# Patient Record
Sex: MALE | Race: WHITE | NOT HISPANIC OR LATINO | URBAN - METROPOLITAN AREA
[De-identification: names, ages, dates, MRNs, and addresses within clinical notes are randomized per-mention and may not be internally consistent; named-entity substitution may affect disease eponyms.]

---

## 2021-12-21 ENCOUNTER — INPATIENT (INPATIENT)
Facility: HOSPITAL | Age: 61
LOS: 2 days | Discharge: HOME | DRG: 871 | End: 2021-12-24
Attending: FAMILY MEDICINE | Admitting: HOSPITALIST
Payer: COMMERCIAL

## 2021-12-21 VITALS — WEIGHT: 246.04 LBS | HEIGHT: 71 IN

## 2021-12-21 DIAGNOSIS — R50.9 FEVER, UNSPECIFIED: ICD-10-CM

## 2021-12-21 LAB
ADD ON TEST-SPECIMEN IN LAB: SIGNIFICANT CHANGE UP
ADD ON TEST-SPECIMEN IN LAB: SIGNIFICANT CHANGE UP
ALBUMIN SERPL ELPH-MCNC: 3.3 G/DL — SIGNIFICANT CHANGE UP (ref 3.3–5)
ALP SERPL-CCNC: 75 U/L — SIGNIFICANT CHANGE UP (ref 40–120)
ALT FLD-CCNC: 32 U/L — SIGNIFICANT CHANGE UP (ref 12–78)
ANION GAP SERPL CALC-SCNC: 6 MMOL/L — SIGNIFICANT CHANGE UP (ref 5–17)
APPEARANCE UR: CLEAR — SIGNIFICANT CHANGE UP
AST SERPL-CCNC: 26 U/L — SIGNIFICANT CHANGE UP (ref 15–37)
BASOPHILS # BLD AUTO: 0.11 K/UL — SIGNIFICANT CHANGE UP (ref 0–0.2)
BASOPHILS NFR BLD AUTO: 0.6 % — SIGNIFICANT CHANGE UP (ref 0–2)
BILIRUB SERPL-MCNC: 1.6 MG/DL — HIGH (ref 0.2–1.2)
BILIRUB UR-MCNC: NEGATIVE — SIGNIFICANT CHANGE UP
BUN SERPL-MCNC: 18 MG/DL — SIGNIFICANT CHANGE UP (ref 7–23)
CALCIUM SERPL-MCNC: 10.7 MG/DL — HIGH (ref 8.5–10.1)
CHLORIDE SERPL-SCNC: 100 MMOL/L — SIGNIFICANT CHANGE UP (ref 96–108)
CO2 SERPL-SCNC: 23 MMOL/L — SIGNIFICANT CHANGE UP (ref 22–31)
COLOR SPEC: YELLOW — SIGNIFICANT CHANGE UP
CREAT SERPL-MCNC: 1.18 MG/DL — SIGNIFICANT CHANGE UP (ref 0.5–1.3)
DIFF PNL FLD: ABNORMAL
EOSINOPHIL # BLD AUTO: 0 K/UL — SIGNIFICANT CHANGE UP (ref 0–0.5)
EOSINOPHIL NFR BLD AUTO: 0 % — SIGNIFICANT CHANGE UP (ref 0–6)
FLUAV AG NPH QL: SIGNIFICANT CHANGE UP
FLUBV AG NPH QL: SIGNIFICANT CHANGE UP
GLUCOSE SERPL-MCNC: 106 MG/DL — HIGH (ref 70–99)
GLUCOSE UR QL: NEGATIVE MG/DL — SIGNIFICANT CHANGE UP
HCT VFR BLD CALC: 45 % — SIGNIFICANT CHANGE UP (ref 39–50)
HGB BLD-MCNC: 15.1 G/DL — SIGNIFICANT CHANGE UP (ref 13–17)
IMM GRANULOCYTES NFR BLD AUTO: 0.7 % — SIGNIFICANT CHANGE UP (ref 0–1.5)
KETONES UR-MCNC: ABNORMAL
LACTATE SERPL-SCNC: 1.1 MMOL/L — SIGNIFICANT CHANGE UP (ref 0.7–2)
LEUKOCYTE ESTERASE UR-ACNC: ABNORMAL
LYMPHOCYTES # BLD AUTO: 1.76 K/UL — SIGNIFICANT CHANGE UP (ref 1–3.3)
LYMPHOCYTES # BLD AUTO: 9.3 % — LOW (ref 13–44)
MAGNESIUM SERPL-MCNC: 2 MG/DL — SIGNIFICANT CHANGE UP (ref 1.6–2.6)
MCHC RBC-ENTMCNC: 29.6 PG — SIGNIFICANT CHANGE UP (ref 27–34)
MCHC RBC-ENTMCNC: 33.6 GM/DL — SIGNIFICANT CHANGE UP (ref 32–36)
MCV RBC AUTO: 88.2 FL — SIGNIFICANT CHANGE UP (ref 80–100)
MONOCYTES # BLD AUTO: 1.48 K/UL — HIGH (ref 0–0.9)
MONOCYTES NFR BLD AUTO: 7.9 % — SIGNIFICANT CHANGE UP (ref 2–14)
NEUTROPHILS # BLD AUTO: 15.36 K/UL — HIGH (ref 1.8–7.4)
NEUTROPHILS NFR BLD AUTO: 81.5 % — HIGH (ref 43–77)
NITRITE UR-MCNC: POSITIVE
PH UR: 5 — SIGNIFICANT CHANGE UP (ref 5–8)
PHOSPHATE SERPL-MCNC: 1.8 MG/DL — LOW (ref 2.5–4.5)
PLATELET # BLD AUTO: 295 K/UL — SIGNIFICANT CHANGE UP (ref 150–400)
POTASSIUM SERPL-MCNC: 4.4 MMOL/L — SIGNIFICANT CHANGE UP (ref 3.5–5.3)
POTASSIUM SERPL-SCNC: 4.4 MMOL/L — SIGNIFICANT CHANGE UP (ref 3.5–5.3)
PROT SERPL-MCNC: 7.6 GM/DL — SIGNIFICANT CHANGE UP (ref 6–8.3)
PROT UR-MCNC: 15 MG/DL
RBC # BLD: 5.1 M/UL — SIGNIFICANT CHANGE UP (ref 4.2–5.8)
RBC # FLD: 12.8 % — SIGNIFICANT CHANGE UP (ref 10.3–14.5)
RSV RNA NPH QL NAA+NON-PROBE: SIGNIFICANT CHANGE UP
SARS-COV-2 RNA SPEC QL NAA+PROBE: SIGNIFICANT CHANGE UP
SODIUM SERPL-SCNC: 129 MMOL/L — LOW (ref 135–145)
SP GR SPEC: 1.02 — SIGNIFICANT CHANGE UP (ref 1.01–1.02)
TSH SERPL-MCNC: <0.01 UU/ML — LOW (ref 0.34–4.82)
UROBILINOGEN FLD QL: NEGATIVE MG/DL — SIGNIFICANT CHANGE UP
WBC # BLD: 18.85 K/UL — HIGH (ref 3.8–10.5)
WBC # FLD AUTO: 18.85 K/UL — HIGH (ref 3.8–10.5)

## 2021-12-21 PROCEDURE — 93010 ELECTROCARDIOGRAM REPORT: CPT

## 2021-12-21 PROCEDURE — 71046 X-RAY EXAM CHEST 2 VIEWS: CPT | Mod: 26

## 2021-12-21 PROCEDURE — 85610 PROTHROMBIN TIME: CPT

## 2021-12-21 PROCEDURE — G0378: CPT

## 2021-12-21 PROCEDURE — 99285 EMERGENCY DEPT VISIT HI MDM: CPT

## 2021-12-21 PROCEDURE — 99223 1ST HOSP IP/OBS HIGH 75: CPT

## 2021-12-21 PROCEDURE — 85027 COMPLETE CBC AUTOMATED: CPT

## 2021-12-21 PROCEDURE — 36415 COLL VENOUS BLD VENIPUNCTURE: CPT

## 2021-12-21 PROCEDURE — 84480 ASSAY TRIIODOTHYRONINE (T3): CPT

## 2021-12-21 PROCEDURE — 86140 C-REACTIVE PROTEIN: CPT

## 2021-12-21 PROCEDURE — 84436 ASSAY OF TOTAL THYROXINE: CPT

## 2021-12-21 PROCEDURE — 87389 HIV-1 AG W/HIV-1&-2 AB AG IA: CPT

## 2021-12-21 PROCEDURE — 80048 BASIC METABOLIC PNL TOTAL CA: CPT

## 2021-12-21 PROCEDURE — 86039 ANTINUCLEAR ANTIBODIES (ANA): CPT

## 2021-12-21 PROCEDURE — 80053 COMPREHEN METABOLIC PANEL: CPT

## 2021-12-21 PROCEDURE — 86803 HEPATITIS C AB TEST: CPT

## 2021-12-21 PROCEDURE — 74176 CT ABD & PELVIS W/O CONTRAST: CPT

## 2021-12-21 PROCEDURE — 85652 RBC SED RATE AUTOMATED: CPT

## 2021-12-21 PROCEDURE — 84145 PROCALCITONIN (PCT): CPT

## 2021-12-21 PROCEDURE — 85025 COMPLETE CBC W/AUTO DIFF WBC: CPT

## 2021-12-21 RX ORDER — CEFTRIAXONE 500 MG/1
1000 INJECTION, POWDER, FOR SOLUTION INTRAMUSCULAR; INTRAVENOUS ONCE
Refills: 0 | Status: COMPLETED | OUTPATIENT
Start: 2021-12-21 | End: 2021-12-21

## 2021-12-21 RX ORDER — PREGABALIN 225 MG/1
1000 CAPSULE ORAL DAILY
Refills: 0 | Status: DISCONTINUED | OUTPATIENT
Start: 2021-12-21 | End: 2021-12-24

## 2021-12-21 RX ORDER — METOPROLOL TARTRATE 50 MG
1 TABLET ORAL
Qty: 0 | Refills: 0 | DISCHARGE

## 2021-12-21 RX ORDER — VALSARTAN 80 MG/1
1 TABLET ORAL
Qty: 0 | Refills: 0 | DISCHARGE

## 2021-12-21 RX ORDER — CHOLECALCIFEROL (VITAMIN D3) 125 MCG
2 CAPSULE ORAL
Qty: 0 | Refills: 0 | DISCHARGE

## 2021-12-21 RX ORDER — ONDANSETRON 8 MG/1
4 TABLET, FILM COATED ORAL EVERY 8 HOURS
Refills: 0 | Status: DISCONTINUED | OUTPATIENT
Start: 2021-12-21 | End: 2021-12-24

## 2021-12-21 RX ORDER — SODIUM CHLORIDE 9 MG/ML
1000 INJECTION INTRAMUSCULAR; INTRAVENOUS; SUBCUTANEOUS
Refills: 0 | Status: DISCONTINUED | OUTPATIENT
Start: 2021-12-21 | End: 2021-12-22

## 2021-12-21 RX ORDER — CHOLECALCIFEROL (VITAMIN D3) 125 MCG
2000 CAPSULE ORAL DAILY
Refills: 0 | Status: DISCONTINUED | OUTPATIENT
Start: 2021-12-21 | End: 2021-12-24

## 2021-12-21 RX ORDER — ROSUVASTATIN CALCIUM 5 MG/1
1 TABLET ORAL
Qty: 0 | Refills: 0 | DISCHARGE

## 2021-12-21 RX ORDER — PREGABALIN 225 MG/1
1 CAPSULE ORAL
Qty: 0 | Refills: 0 | DISCHARGE

## 2021-12-21 RX ORDER — LANOLIN ALCOHOL/MO/W.PET/CERES
3 CREAM (GRAM) TOPICAL AT BEDTIME
Refills: 0 | Status: DISCONTINUED | OUTPATIENT
Start: 2021-12-21 | End: 2021-12-24

## 2021-12-21 RX ORDER — ATORVASTATIN CALCIUM 80 MG/1
20 TABLET, FILM COATED ORAL AT BEDTIME
Refills: 0 | Status: DISCONTINUED | OUTPATIENT
Start: 2021-12-21 | End: 2021-12-24

## 2021-12-21 RX ORDER — CEFTRIAXONE 500 MG/1
1000 INJECTION, POWDER, FOR SOLUTION INTRAMUSCULAR; INTRAVENOUS EVERY 24 HOURS
Refills: 0 | Status: DISCONTINUED | OUTPATIENT
Start: 2021-12-22 | End: 2021-12-24

## 2021-12-21 RX ORDER — SODIUM CHLORIDE 9 MG/ML
1000 INJECTION INTRAMUSCULAR; INTRAVENOUS; SUBCUTANEOUS ONCE
Refills: 0 | Status: COMPLETED | OUTPATIENT
Start: 2021-12-21 | End: 2021-12-21

## 2021-12-21 RX ORDER — VALSARTAN 80 MG/1
40 TABLET ORAL DAILY
Refills: 0 | Status: DISCONTINUED | OUTPATIENT
Start: 2021-12-21 | End: 2021-12-24

## 2021-12-21 RX ORDER — SODIUM CHLORIDE 9 MG/ML
1250 INJECTION INTRAMUSCULAR; INTRAVENOUS; SUBCUTANEOUS ONCE
Refills: 0 | Status: COMPLETED | OUTPATIENT
Start: 2021-12-21 | End: 2021-12-21

## 2021-12-21 RX ORDER — IBUPROFEN 200 MG
600 TABLET ORAL ONCE
Refills: 0 | Status: COMPLETED | OUTPATIENT
Start: 2021-12-21 | End: 2021-12-21

## 2021-12-21 RX ORDER — ACETAMINOPHEN 500 MG
1000 TABLET ORAL ONCE
Refills: 0 | Status: COMPLETED | OUTPATIENT
Start: 2021-12-21 | End: 2021-12-21

## 2021-12-21 RX ORDER — METOPROLOL TARTRATE 50 MG
25 TABLET ORAL DAILY
Refills: 0 | Status: DISCONTINUED | OUTPATIENT
Start: 2021-12-21 | End: 2021-12-24

## 2021-12-21 RX ORDER — ACETAMINOPHEN 500 MG
650 TABLET ORAL EVERY 6 HOURS
Refills: 0 | Status: DISCONTINUED | OUTPATIENT
Start: 2021-12-21 | End: 2021-12-24

## 2021-12-21 RX ADMIN — Medication 600 MILLIGRAM(S): at 17:51

## 2021-12-21 RX ADMIN — SODIUM CHLORIDE 1250 MILLILITER(S): 9 INJECTION INTRAMUSCULAR; INTRAVENOUS; SUBCUTANEOUS at 19:25

## 2021-12-21 RX ADMIN — CEFTRIAXONE 100 MILLIGRAM(S): 500 INJECTION, POWDER, FOR SOLUTION INTRAMUSCULAR; INTRAVENOUS at 19:25

## 2021-12-21 RX ADMIN — Medication 1000 MILLIGRAM(S): at 17:51

## 2021-12-21 RX ADMIN — SODIUM CHLORIDE 2000 MILLILITER(S): 9 INJECTION INTRAMUSCULAR; INTRAVENOUS; SUBCUTANEOUS at 17:01

## 2021-12-21 NOTE — ED STATDOCS - OBJECTIVE STATEMENT
60 y/o male with a PMHx of enlarged prostate, gout, HTN, HLD, hyperthyroid presents to the ED c/o chills x2 days. Pt reports intermittent fevers over the last month. Pt developed chills 2 days ago. +diarrhea, +HA, +unintentional 20lb weight loss over the last 2 months. Pt reports he has the urge to urinate but can not. Recent travel to [place]. Nonsmoker. No drug use. No other complaints at this time.

## 2021-12-21 NOTE — H&P ADULT - HISTORY OF PRESENT ILLNESS
61 year old male patient with pertinent past medical history of Hyperthyroidism presented to the ED complaining of intermittent fevers( tmax of 103) associated with chills, diaphoresis, loose stools, headache, urinary urgency and frequency. Of note, patient also with unintentional 20 pound weight loss over the past 2 months. Endorses being compliant with his methimazole.      In the ED patient found to have fever( 103) , tachycardia, wbc of 18

## 2021-12-21 NOTE — ED STATDOCS - NSICDXPASTMEDICALHX_GEN_ALL_CORE_FT
PAST MEDICAL HISTORY:  Enlarged prostate     Gout     HLD (hyperlipidemia)     HTN (hypertension)     Hyperthyroidism

## 2021-12-21 NOTE — H&P ADULT - NSHPPHYSICALEXAM_GEN_ALL_CORE
Vital Signs Last 24 Hrs  T(C): 36.8 (21 Dec 2021 19:06), Max: 39.4 (21 Dec 2021 15:45)  T(F): 98.2 (21 Dec 2021 19:06), Max: 103 (21 Dec 2021 15:45)  HR: 100 (21 Dec 2021 19:06) (100 - 112)  BP: 114/78 (21 Dec 2021 19:06) (112/79 - 114/78)  BP(mean): 88 (21 Dec 2021 15:45) (88 - 88)  RR: 18 (21 Dec 2021 19:06) (17 - 18)  SpO2: 93% (21 Dec 2021 19:06) (93% - 93%)

## 2021-12-21 NOTE — ED ADULT TRIAGE NOTE - CHIEF COMPLAINT QUOTE
pt c/o decreased PO intake, chills, loose stool, 20 lb weight loss in the last 2 months. pt states "I feel the urge to pee but only a small amount comes out." PMH- HTN, gout, HLD, hyperthryoid

## 2021-12-21 NOTE — ED STATDOCS - ATTENDING CONTRIBUTION TO CARE
I, Lalit Gregorio MD,  performed the initial face to face bedside interview with this patient regarding history of present illness, review of symptoms and relevant past medical, social and family history.  I completed an independent physical examination.  I was the initial provider who evaluated this patient.   I personally saw the patient and performed a substantive portion of the visit including all aspects of the medical decision making.  I have signed out the follow up of any pending tests (i.e. labs, radiological studies) to the ACP.  I have communicated the patient’s plan of care and disposition with the ACP.  The history, relevant review of systems, past medical and surgical history, medical decision making, and physical examination was documented by the scribe in my presence and I attest to the accuracy of the documentation.

## 2021-12-21 NOTE — ED STATDOCS - PROGRESS NOTE DETAILS
signed Tiffanie Biswas PA-C Pt seen initially in intake by Dr. Gregorio   61M c/o intermittent fever for a few weeks, particularly the past few days. several COVID-19 test negative. PMH lyme, hypothyroid. Pt also notes some difficulty with urination and an unintentional 20lb wt loss in 2 months. pt alert, NAD, exam non focal. PLan labs, lyme, babesia, flu/covid/rsv, UA, cxr. Pt agrees with plan of  care. PMDESTINEE Ramos signed Tiffanie Biswas PA-C   Labs notable for WBC 18, TSH undetectable, , +uti. flu/covid/rsv negative. cxr negative. Pt notes he is on methimazole for hyperthyroid. Denies seizures or agitation but +soft stools. Concern for possible thyroid storm. Will admit and treat for fever and uti, must r/o thyroid storm.  lactate negative, fluid bolus given using ideal body weight. Pt agrees with admission and plan of care. Case discussed with and admission accepted by hospitalist Dr. Velasquez.

## 2021-12-21 NOTE — ED STATDOCS - CARE PLAN
1 Principal Discharge DX:	Fever  Secondary Diagnosis:	Low TSH level  Secondary Diagnosis:	Acute UTI

## 2021-12-21 NOTE — ED STATDOCS - CLINICAL SUMMARY MEDICAL DECISION MAKING FREE TEXT BOX
Pt presents with intermittent fevers x1 month now with diarrhea and difficulty urinating. Plan: labs, XR, bladder scan.

## 2021-12-21 NOTE — ED ADULT NURSE NOTE - OBJECTIVE STATEMENT
Patient has had loose stool for 24 hours, 20 pound weight loss due to poor appetite and not eating over past 2 months and difficulty passing urine.

## 2021-12-21 NOTE — H&P ADULT - ASSESSMENT
61 year old male patient with intermittent fevers for 3 weeks now with urosepsis        -Admit to [facility]          # Sepsis  -likely secondary to UTI  -f/u blood and urine cx  -give IVF hydration in the ED  -will continue with rocephin    # Fever of Unknown origin  -etiology not clear  -pt with uti but does not explain persistent elevated temps to 103  -will get routine CT abdomen/pelvis and chest to rule out abscess/ intra-abdominal pathology  -check ROBIN, CRP, procalcitonin,hepatitis, esr, lyme and babesia  -f/u blood and urine cx to rule out occult bacteremia  -get ID consult      #Hyponatremia  -trial of IVF hydration  -trend sodium    # Hx of Hyperthyroidism  -pt with unintentional weight loss despite being compliant with meds  -also tachycardic and diaphoretic  -unsure if above infection contributing   -get routine endocrinology evaluation    #HTN  -on valsartan    #DVT ppx  -encourage ambulation

## 2021-12-21 NOTE — ED ADULT TRIAGE NOTE - CCCP TRG CHIEF CMPLNT
multiple medical complaints Subsequent Stages Histo Method Verbiage: Using a similar technique to that described above, a thin layer of tissue was removed from all areas where tumor was visible on the previous stage.  The tissue was again oriented, mapped, dyed, and processed as above.

## 2021-12-22 LAB
ALBUMIN SERPL ELPH-MCNC: 2.7 G/DL — LOW (ref 3.3–5)
ALP SERPL-CCNC: 60 U/L — SIGNIFICANT CHANGE UP (ref 40–120)
ALT FLD-CCNC: 25 U/L — SIGNIFICANT CHANGE UP (ref 12–78)
ANION GAP SERPL CALC-SCNC: 8 MMOL/L — SIGNIFICANT CHANGE UP (ref 5–17)
AST SERPL-CCNC: 14 U/L — LOW (ref 15–37)
B BURGDOR C6 AB SER-ACNC: NEGATIVE — SIGNIFICANT CHANGE UP
B BURGDOR IGG+IGM SER QL IB: SIGNIFICANT CHANGE UP
B BURGDOR IGG+IGM SER-ACNC: 0.14 INDEX — SIGNIFICANT CHANGE UP (ref 0.01–0.89)
BASOPHILS # BLD AUTO: 0.06 K/UL — SIGNIFICANT CHANGE UP (ref 0–0.2)
BASOPHILS NFR BLD AUTO: 0.6 % — SIGNIFICANT CHANGE UP (ref 0–2)
BILIRUB SERPL-MCNC: 0.9 MG/DL — SIGNIFICANT CHANGE UP (ref 0.2–1.2)
BUN SERPL-MCNC: 22 MG/DL — SIGNIFICANT CHANGE UP (ref 7–23)
CALCIUM SERPL-MCNC: 9.6 MG/DL — SIGNIFICANT CHANGE UP (ref 8.5–10.1)
CHLORIDE SERPL-SCNC: 107 MMOL/L — SIGNIFICANT CHANGE UP (ref 96–108)
CO2 SERPL-SCNC: 22 MMOL/L — SIGNIFICANT CHANGE UP (ref 22–31)
CREAT SERPL-MCNC: 0.82 MG/DL — SIGNIFICANT CHANGE UP (ref 0.5–1.3)
CRP SERPL-MCNC: 148 MG/L — HIGH
EOSINOPHIL # BLD AUTO: 0.01 K/UL — SIGNIFICANT CHANGE UP (ref 0–0.5)
EOSINOPHIL NFR BLD AUTO: 0.1 % — SIGNIFICANT CHANGE UP (ref 0–6)
ERYTHROCYTE [SEDIMENTATION RATE] IN BLOOD: 56 MM/HR — HIGH (ref 0–20)
GLUCOSE SERPL-MCNC: 118 MG/DL — HIGH (ref 70–99)
HCT VFR BLD CALC: 38.6 % — LOW (ref 39–50)
HCV AB S/CO SERPL IA: 0.15 S/CO — SIGNIFICANT CHANGE UP (ref 0–0.99)
HCV AB SERPL-IMP: SIGNIFICANT CHANGE UP
HGB BLD-MCNC: 13.1 G/DL — SIGNIFICANT CHANGE UP (ref 13–17)
IMM GRANULOCYTES NFR BLD AUTO: 0.6 % — SIGNIFICANT CHANGE UP (ref 0–1.5)
INR BLD: 1.32 RATIO — HIGH (ref 0.88–1.16)
LYMPHOCYTES # BLD AUTO: 0.63 K/UL — LOW (ref 1–3.3)
LYMPHOCYTES # BLD AUTO: 6 % — LOW (ref 13–44)
MCHC RBC-ENTMCNC: 29.7 PG — SIGNIFICANT CHANGE UP (ref 27–34)
MCHC RBC-ENTMCNC: 33.9 GM/DL — SIGNIFICANT CHANGE UP (ref 32–36)
MCV RBC AUTO: 87.5 FL — SIGNIFICANT CHANGE UP (ref 80–100)
MONOCYTES # BLD AUTO: 0.88 K/UL — SIGNIFICANT CHANGE UP (ref 0–0.9)
MONOCYTES NFR BLD AUTO: 8.4 % — SIGNIFICANT CHANGE UP (ref 2–14)
NEUTROPHILS # BLD AUTO: 8.89 K/UL — HIGH (ref 1.8–7.4)
NEUTROPHILS NFR BLD AUTO: 84.3 % — HIGH (ref 43–77)
PLATELET # BLD AUTO: 218 K/UL — SIGNIFICANT CHANGE UP (ref 150–400)
POTASSIUM SERPL-MCNC: 3.6 MMOL/L — SIGNIFICANT CHANGE UP (ref 3.5–5.3)
POTASSIUM SERPL-SCNC: 3.6 MMOL/L — SIGNIFICANT CHANGE UP (ref 3.5–5.3)
PROCALCITONIN SERPL-MCNC: 1.73 NG/ML — HIGH (ref 0.02–0.1)
PROT SERPL-MCNC: 6.3 GM/DL — SIGNIFICANT CHANGE UP (ref 6–8.3)
PROTHROM AB SERPL-ACNC: 15.1 SEC — HIGH (ref 10.6–13.6)
RBC # BLD: 4.41 M/UL — SIGNIFICANT CHANGE UP (ref 4.2–5.8)
RBC # FLD: 12.9 % — SIGNIFICANT CHANGE UP (ref 10.3–14.5)
SODIUM SERPL-SCNC: 137 MMOL/L — SIGNIFICANT CHANGE UP (ref 135–145)
WBC # BLD: 10.53 K/UL — HIGH (ref 3.8–10.5)
WBC # FLD AUTO: 10.53 K/UL — HIGH (ref 3.8–10.5)

## 2021-12-22 PROCEDURE — 74176 CT ABD & PELVIS W/O CONTRAST: CPT | Mod: 26

## 2021-12-22 PROCEDURE — 99232 SBSQ HOSP IP/OBS MODERATE 35: CPT

## 2021-12-22 RX ORDER — ENOXAPARIN SODIUM 100 MG/ML
40 INJECTION SUBCUTANEOUS DAILY
Refills: 0 | Status: DISCONTINUED | OUTPATIENT
Start: 2021-12-22 | End: 2021-12-24

## 2021-12-22 RX ORDER — TAMSULOSIN HYDROCHLORIDE 0.4 MG/1
0.4 CAPSULE ORAL AT BEDTIME
Refills: 0 | Status: DISCONTINUED | OUTPATIENT
Start: 2021-12-22 | End: 2021-12-24

## 2021-12-22 RX ORDER — TAMSULOSIN HYDROCHLORIDE 0.4 MG/1
0.4 CAPSULE ORAL AT BEDTIME
Refills: 0 | Status: DISCONTINUED | OUTPATIENT
Start: 2021-12-22 | End: 2021-12-22

## 2021-12-22 RX ADMIN — TAMSULOSIN HYDROCHLORIDE 0.4 MILLIGRAM(S): 0.4 CAPSULE ORAL at 22:02

## 2021-12-22 RX ADMIN — Medication 650 MILLIGRAM(S): at 03:41

## 2021-12-22 RX ADMIN — Medication 2000 UNIT(S): at 09:25

## 2021-12-22 RX ADMIN — Medication 3 MILLIGRAM(S): at 22:02

## 2021-12-22 RX ADMIN — Medication 25 MILLIGRAM(S): at 09:25

## 2021-12-22 RX ADMIN — Medication 650 MILLIGRAM(S): at 04:00

## 2021-12-22 RX ADMIN — VALSARTAN 40 MILLIGRAM(S): 80 TABLET ORAL at 09:25

## 2021-12-22 RX ADMIN — PREGABALIN 1000 MICROGRAM(S): 225 CAPSULE ORAL at 09:25

## 2021-12-22 RX ADMIN — Medication 650 MILLIGRAM(S): at 19:02

## 2021-12-22 RX ADMIN — ENOXAPARIN SODIUM 40 MILLIGRAM(S): 100 INJECTION SUBCUTANEOUS at 17:16

## 2021-12-22 RX ADMIN — SODIUM CHLORIDE 100 MILLILITER(S): 9 INJECTION INTRAMUSCULAR; INTRAVENOUS; SUBCUTANEOUS at 00:34

## 2021-12-22 RX ADMIN — CEFTRIAXONE 100 MILLIGRAM(S): 500 INJECTION, POWDER, FOR SOLUTION INTRAMUSCULAR; INTRAVENOUS at 17:16

## 2021-12-22 RX ADMIN — SODIUM CHLORIDE 100 MILLILITER(S): 9 INJECTION INTRAMUSCULAR; INTRAVENOUS; SUBCUTANEOUS at 08:15

## 2021-12-22 RX ADMIN — ATORVASTATIN CALCIUM 20 MILLIGRAM(S): 80 TABLET, FILM COATED ORAL at 22:03

## 2021-12-22 NOTE — CONSULT NOTE ADULT - ASSESSMENT
61 year old male patient with pertinent past medical history of Hyperthyroidism admitted on 12/21 for evaluation of fever to 103, chills, sweats, headache and urinary frequency and urgency; the patient notes that at night there is more sweating, but was not sure if this was due to his hyperthyroidism. The patient is feeling better since admission and having received iv antibiotics. Was vaccinated against COVID-19 infection as well as influenza. Was not on any antibiotics prior for urinary infection or hospitalized prior for infections.     1. Patient admitted with pyelonephritis, most likely as the cause of his fever; also noted with leukocytosis as this is probably reactive to infection  - follow up cultures   - serial cbc and monitor temperature   - reviewed prior medical records to evaluate for resistant or atypical pathogens   - iv hydration and supportive care   - will continue ceftriaxone as ordered; probably a component of prostatitis as well  - consideration for urology evaluation  - will most likely will need a longer course of antibiotics, but oral when ready for discharge  2. other issues: per medicine

## 2021-12-22 NOTE — DIETITIAN INITIAL EVALUATION ADULT. - OTHER INFO
61 year old male patient with pertinent past medical history of Hyperthyroidism presented to the ED complaining of intermittent fevers( tmax of 103) associated with chills, diaphoresis, loose stools, headache, urinary urgency and frequency. Of note, patient also with unintentional 20 pound weight loss over the past 2 months. Endorses being compliant with his methimazole. CT abdomen showed mild bilateral perinephric stranding without Hydro- patient admitted with UTI and IV antibiotic was started. # Sepsis-likely secondary to UTI. NFPE indicates no muscle/fat wasting at this time. Criteria met for severe protein/calorie malnutrition in context of acute illness. Discussed adding protein enriched foods with all meals to meet increased demand. Will continue to monitor and follow up prn.

## 2021-12-22 NOTE — DIETITIAN INITIAL EVALUATION ADULT. - ADD RECOMMEND
1) continue with regular diet and encourage protein enriched foods with all meals to meet increased demand 2) monitor BM if none x > 3 days consider initiating bowel meds to decrease risk of constipation. 3) MVI w/ minerals to meet RDI's 4) monitor lytes and hydration replete prn 5) if po declines consider additional po supplement shakes daily in between meals

## 2021-12-22 NOTE — DIETITIAN INITIAL EVALUATION ADULT. - PERTINENT LABORATORY DATA
12-22    137  |  107  |  22  ----------------------------<  118<H>  3.6   |  22  |  0.82    Ca    9.6      22 Dec 2021 06:55  Phos  1.8     12-21  Mg     2.0     12-21    TPro  6.3  /  Alb  2.7<L>  /  TBili  0.9  /  DBili  x   /  AST  14<L>  /  ALT  25  /  AlkPhos  60  12-22  BMI: BMI (kg/m2): 34.3 (12-21-21 @ 14:25)  HbA1c:   Glucose:   BP: 124/70 (12-22-21 @ 08:34) (112/79 - 124/78)  Lipid Panel:

## 2021-12-22 NOTE — PATIENT PROFILE ADULT - FALL HARM RISK - UNIVERSAL INTERVENTIONS
Bed in lowest position, wheels locked, appropriate side rails in place/Call bell, personal items and telephone in reach/Instruct patient to call for assistance before getting out of bed or chair/Non-slip footwear when patient is out of bed/[place] to call system/Physically safe environment - no spills, clutter or unnecessary equipment/Purposeful Proactive Rounding/Room/bathroom lighting operational, light cord in reach

## 2021-12-22 NOTE — CONSULT NOTE ADULT - SUBJECTIVE AND OBJECTIVE BOX
Patient is a 61y old  Male who presents with a chief complaint of Fever/ UTI (22 Dec 2021 14:16)    HPI:  61 year old male patient with pertinent past medical history of Hyperthyroidism admitted on  for evaluation of fever to 103, chills, sweats, headache and urinary frequency and urgency; the patient notes that at night there is more sweating, but was not sure if this was due to his hyperthyroidism. The patient is feeling better since admission and having received iv antibiotics. Was vaccinated against COVID-19 infection as well as influenza. Was not on any antibiotics prior for urinary infection or hospitalized prior for infections.           PMH: as above  PSH: as above  Meds: per reconciliation sheet, noted below  MEDICATIONS  (STANDING):  atorvastatin 20 milliGRAM(s) Oral at bedtime  cefTRIAXone   IVPB 1000 milliGRAM(s) IV Intermittent every 24 hours  cholecalciferol Oral Tab/Cap - Peds 2000 Unit(s) Oral daily  cyanocobalamin 1000 MICROGram(s) Oral daily  enoxaparin Injectable 40 milliGRAM(s) SubCutaneous daily  methimazole 5 milliGRAM(s) Oral daily  metoprolol succinate ER 25 milliGRAM(s) Oral daily  tamsulosin 0.4 milliGRAM(s) Oral at bedtime  valsartan 40 milliGRAM(s) Oral daily    MEDICATIONS  (PRN):  acetaminophen     Tablet .. 650 milliGRAM(s) Oral every 6 hours PRN Temp greater or equal to 38C (100.4F), Mild Pain (1 - 3)  aluminum hydroxide/magnesium hydroxide/simethicone Suspension 30 milliLiter(s) Oral every 4 hours PRN Dyspepsia  artificial  tears Solution 1 Drop(s) Both EYES every 4 hours PRN Dry Eyes  melatonin 3 milliGRAM(s) Oral at bedtime PRN Insomnia  ondansetron Injectable 4 milliGRAM(s) IV Push every 8 hours PRN Nausea and/or Vomiting    Allergies    No Known Allergies    Intolerances      Social: no smoking, no alcohol, no illegal drugs; no recent travel, no exposure to TB  FAMILY HISTORY:     no history of premature cardiovascular disease in first degree relatives  ROS: the patient has no HA, no dizziness, no sore throat, no blurry vision, no CP, no palpitations, no SOB, no cough, no abdominal pain, no diarrhea, no N/V,  no leg pain, no claudication, no rash, no joint aches, no rectal pain or bleeding, no night sweats  All other systems reviewed and are negative    Vital Signs Last 24 Hrs  T(C): 36.7 (22 Dec 2021 08:34), Max: 37.8 (22 Dec 2021 06:22)  T(F): 98 (22 Dec 2021 08:34), Max: 100.1 (22 Dec 2021 06:22)  HR: 97 (22 Dec 2021 08:34) (68 - 100)  BP: 124/70 (22 Dec 2021 08:34) (114/78 - 124/78)  BP(mean): 90 (21 Dec 2021 23:20) (90 - 90)  RR: 18 (22 Dec 2021 08:34) (18 - 20)  SpO2: 96% (22 Dec 2021 08:34) (93% - 99%)  Daily     Daily Weight in k.6 (22 Dec 2021 00:28)    PE:    Constitutional: frail looking  HEENT: NC/AT, EOMI, PERRLA, conjunctivae clear; ears and nose atraumatic; pharynx clear  Neck: supple; thyroid not palpable  Back: no tenderness  Respiratory: respiratory effort normal; clear to auscultation  Cardiovascular: S1S2 regular, no murmurs  Abdomen: soft, not tender, not distended, positive BS; no liver or spleen organomegaly  Genitourinary: no suprapubic tenderness, no CVA tenderness  Musculoskeletal: no muscle tenderness, no joint swelling or tenderness  Neurological/ Psychiatric: AxOx3, judgement and insight normal;  moving all extremities  Skin: no rashes; no palpable lesions    Labs: all available labs reviewed                        13.1   10.53 )-----------( 218      ( 22 Dec 2021 06:55 )             38.6     12-    137  |  107  |  22  ----------------------------<  118<H>  3.6   |  22  |  0.82    Ca    9.6      22 Dec 2021 06:55  Phos  1.8     12-  Mg     2.0     12-    TPro  6.3  /  Alb  2.7<L>  /  TBili  0.9  /  DBili  x   /  AST  14<L>  /  ALT  25  /  AlkPhos  60  12-22     LIVER FUNCTIONS - ( 22 Dec 2021 06:55 )  Alb: 2.7 g/dL / Pro: 6.3 gm/dL / ALK PHOS: 60 U/L / ALT: 25 U/L / AST: 14 U/L / GGT: x           Urinalysis Basic - ( 21 Dec 2021 17:19 )    Color: Yellow / Appearance: Clear / S.025 / pH: x  Gluc: x / Ketone: Large  / Bili: Negative / Urobili: Negative mg/dL   Blood: x / Protein: 15 mg/dL / Nitrite: Positive   Leuk Esterase: Trace / RBC: 6-10 /HPF / WBC 11-25   Sq Epi: x / Non Sq Epi: Occasional / Bacteria: Many      < from: CT Abdomen and Pelvis No Cont (21 @ 00:03) >    ACC: [id number] EXAM:  CT ABDOMEN AND PELVIS                          PROCEDURE DATE:  2021          INTERPRETATION:  CLINICAL INFORMATION: Fever.    PROCEDURE:  Helical axial images were obtained from the domes of the diaphragm   through the pubic symphysis without intravenous or oral contrast. Coronal   and sagittal reformats were also obtained.    CONTRAST/COMPLICATIONS:  IV Contrast: NONE  Oral Contrast: NONE  Complications: None reported at time of study completion    COMPARISON: None.    FINDINGS: Evaluation of the abdominal/pelvic organs, viscera and   vasculature is limited without intravenous contrast.    LOWER CHEST: Mild atelectasis.    LIVER: Unremarkable.  GALLBLADDER/BILE DUCTS: No intrahepatic or extrahepatic biliary   dilatation. No radiopaque gallstone.  PANCREAS: Diffuse fatty atrophy.  SPLEEN: Unremarkable.    ADRENALS: Unremarkable.  KIDNEYS/URETERS: Nonspecific mild bilateral perinephric stranding without   hydroureteronephrosis. No obstructing radiopaque urinary tract stone.   Nonobstructing 0.2 x 0.3 cm left renal stone. A 1.0 x 1.0 cm right renal   cyst.  BLADDER: Partially distended. Prominent wall.  REPRODUCTIVE ORGANS: Enlarged prostate. Nonspecific stranding surrounding   the seminal vesicles/prostate.    BOWEL: No bowel obstruction. Unremarkable appendix.  PERITONEUM: No drainable fluid collection or free air.  VESSELS: Atherosclerosis. Normal caliber of the abdominal aorta.  RETROPERITONEUM: Subcentimeter lymph nodes without lymphadenopathy.  ABDOMINAL WALL/SOFT TISSUES: Small fat-containing inguinal hernias.  BONES: Degenerative changes of the spine. Chronic anterior wedging of the   thoracolumbar spine.    IMPRESSION:    Nonspecific mild bilateral perinephric stranding without   hydroureteronephrosis or obstructing radiopaque urinary tract stone.   Prominent bladder wall. Nonspecific stranding surrounding the seminal   vesicles/prostate. Recommend clinical correlation to assess urinary tract   infection/prostatitis/seminal vesiculitis.    Additional findings as described.    < end of copied text >      Radiology: all available radiological tests reviewed    Advanced directives addressed: full resuscitation

## 2021-12-22 NOTE — DIETITIAN INITIAL EVALUATION ADULT. - MALNUTRITION
severe protein/calorie malnutrition in context of acute illness severe protein/calorie malnutrition in context of acute illness r/t suboptimal nutrition 2/2 UTI AEB po intake <50% x > 5 days and significant weight loss x 2.5 months (7.8%)

## 2021-12-22 NOTE — DIETITIAN NUTRITION RISK NOTIFICATION - ADDITIONAL COMMENTS/DIETITIAN RECOMMENDATIONS
· Additional Recommendations  1) continue with regular diet and encourage protein enriched foods with all meals to meet increased demand 2) monitor BM if none x > 3 days consider initiating bowel meds to decrease risk of constipation. 3) MVI w/ minerals to meet RDI's 4) monitor lytes and hydration replete prn 5) if po declines consider additional po supplement shakes daily in between meals

## 2021-12-22 NOTE — PROGRESS NOTE ADULT - ASSESSMENT
61 year old male patient with intermittent fevers for 3 weeks admitted with sepsis of urinary origin     # Sepsis  -likely secondary to UTI  -f/u blood and urine cx- pending   -give IVF hydration in the ED  -Rocephin IV  -monitor temps  - monitor CBC   - leukocytosis- improved  - ID consult      # Fever of Unknown origin  -etiology not clear  -pt with uti but does not explain persistent elevated temps to 103  -will get routine CT abdomen/pelvis and chest to rule out abscess/ intra-abdominal pathology  -check ROBIN, CRP, procalcitonin,hepatitis, esr, lyme and babesia- pending results   -f/u blood and urine cx to rule out occult bacteremia  -get ID consult    #Hyponatremia- resolved   -trial of IVF hydration  -trend sodium    # Hx of Hyperthyroidism  -pt with unintentional weight loss despite being compliant with meds  -also tachycardic and diaphoretic  -unsure if above infection contributing   -get routine endocrinology evaluation  -TSH <0.01- pending T3 and T4    #HTN  -on valsartan    #DVT ppx  -encourage ambulation    Case d/w team on IDR.        61 year old male patient with intermittent fevers for 3 weeks admitted with sepsis of urinary origin     # Sepsis  -likely secondary to UTI  -f/u blood and urine cx- pending   -give IVF hydration in the ED  -Rocephin IV  -monitor temps  - monitor CBC   - leukocytosis- improved  - ID consult      # Fever of Unknown origin  -etiology not clear  -pt with uti but does not explain persistent elevated temps to 103  -will get routine CT abdomen/pelvis and chest to rule out abscess/ intra-abdominal pathology  -check ROBIN, CRP, procalcitonin,hepatitis, esr, lyme and babesia- pending results   -f/u blood and urine cx to rule out occult bacteremia  -get ID consult    #Hyponatremia- resolved   -trial of IVF hydration  -trend sodium    # Hx of Hyperthyroidism  -pt with unintentional weight loss despite being compliant with meds  -also tachycardic and diaphoretic  -unsure if above infection contributing   -get routine endocrinology evaluation  -TSH <0.01- pending T3 and T4  - restart methimazole     #HTN  -on valsartan    #DVT ppx  -encourage ambulation    Case d/w team on IDR.

## 2021-12-23 LAB
ANION GAP SERPL CALC-SCNC: 7 MMOL/L — SIGNIFICANT CHANGE UP (ref 5–17)
BUN SERPL-MCNC: 15 MG/DL — SIGNIFICANT CHANGE UP (ref 7–23)
CALCIUM SERPL-MCNC: 9.7 MG/DL — SIGNIFICANT CHANGE UP (ref 8.5–10.1)
CHLORIDE SERPL-SCNC: 107 MMOL/L — SIGNIFICANT CHANGE UP (ref 96–108)
CO2 SERPL-SCNC: 23 MMOL/L — SIGNIFICANT CHANGE UP (ref 22–31)
CREAT SERPL-MCNC: 0.79 MG/DL — SIGNIFICANT CHANGE UP (ref 0.5–1.3)
GLUCOSE SERPL-MCNC: 116 MG/DL — HIGH (ref 70–99)
HCT VFR BLD CALC: 39.7 % — SIGNIFICANT CHANGE UP (ref 39–50)
HGB BLD-MCNC: 13.7 G/DL — SIGNIFICANT CHANGE UP (ref 13–17)
MCHC RBC-ENTMCNC: 29.8 PG — SIGNIFICANT CHANGE UP (ref 27–34)
MCHC RBC-ENTMCNC: 34.5 GM/DL — SIGNIFICANT CHANGE UP (ref 32–36)
MCV RBC AUTO: 86.3 FL — SIGNIFICANT CHANGE UP (ref 80–100)
PLATELET # BLD AUTO: 218 K/UL — SIGNIFICANT CHANGE UP (ref 150–400)
POTASSIUM SERPL-MCNC: 3.7 MMOL/L — SIGNIFICANT CHANGE UP (ref 3.5–5.3)
POTASSIUM SERPL-SCNC: 3.7 MMOL/L — SIGNIFICANT CHANGE UP (ref 3.5–5.3)
RBC # BLD: 4.6 M/UL — SIGNIFICANT CHANGE UP (ref 4.2–5.8)
RBC # FLD: 12.8 % — SIGNIFICANT CHANGE UP (ref 10.3–14.5)
SODIUM SERPL-SCNC: 137 MMOL/L — SIGNIFICANT CHANGE UP (ref 135–145)
WBC # BLD: 5.4 K/UL — SIGNIFICANT CHANGE UP (ref 3.8–10.5)
WBC # FLD AUTO: 5.4 K/UL — SIGNIFICANT CHANGE UP (ref 3.8–10.5)

## 2021-12-23 PROCEDURE — 99232 SBSQ HOSP IP/OBS MODERATE 35: CPT

## 2021-12-23 RX ADMIN — ENOXAPARIN SODIUM 40 MILLIGRAM(S): 100 INJECTION SUBCUTANEOUS at 17:05

## 2021-12-23 RX ADMIN — Medication 650 MILLIGRAM(S): at 13:15

## 2021-12-23 RX ADMIN — Medication 25 MILLIGRAM(S): at 09:15

## 2021-12-23 RX ADMIN — PREGABALIN 1000 MICROGRAM(S): 225 CAPSULE ORAL at 09:15

## 2021-12-23 RX ADMIN — VALSARTAN 40 MILLIGRAM(S): 80 TABLET ORAL at 09:15

## 2021-12-23 RX ADMIN — CEFTRIAXONE 100 MILLIGRAM(S): 500 INJECTION, POWDER, FOR SOLUTION INTRAMUSCULAR; INTRAVENOUS at 17:04

## 2021-12-23 RX ADMIN — Medication 2000 UNIT(S): at 09:15

## 2021-12-23 RX ADMIN — TAMSULOSIN HYDROCHLORIDE 0.4 MILLIGRAM(S): 0.4 CAPSULE ORAL at 21:53

## 2021-12-23 RX ADMIN — ATORVASTATIN CALCIUM 20 MILLIGRAM(S): 80 TABLET, FILM COATED ORAL at 21:53

## 2021-12-23 RX ADMIN — Medication 3 MILLIGRAM(S): at 21:53

## 2021-12-23 NOTE — PROGRESS NOTE ADULT - ASSESSMENT
61 year old male patient with pertinent past medical history of Hyperthyroidism admitted on 12/21 for evaluation of fever to 103, chills, sweats, headache and urinary frequency and urgency; the patient notes that at night there is more sweating, but was not sure if this was due to his hyperthyroidism. The patient is feeling better since admission and having received iv antibiotics. Was vaccinated against COVID-19 infection as well as influenza. Was not on any antibiotics prior for urinary infection or hospitalized prior for infections.     1. Patient admitted with pyelonephritis, most likely as the cause of his fever; also noted with leukocytosis as this is probably reactive to infection  - follow up cultures -- found to have E coli in urine; awaiting sensi  - serial cbc and monitor temperature   - reviewed prior medical records to evaluate for resistant or atypical pathogens   - iv hydration and supportive care   - will continue ceftriaxone as ordered; probably a component of prostatitis as well  - consideration for urology evaluation  - will most likely will need a longer course of antibiotics, but oral when ready for discharge  2. other issues: per medicine 61 year old male patient with pertinent past medical history of Hyperthyroidism admitted on 12/21 for evaluation of fever to 103, chills, sweats, headache and urinary frequency and urgency; the patient notes that at night there is more sweating, but was not sure if this was due to his hyperthyroidism. The patient is feeling better since admission and having received iv antibiotics. Was vaccinated against COVID-19 infection as well as influenza. Was not on any antibiotics prior for urinary infection or hospitalized prior for infections.     1. Patient admitted with pyelonephritis, most likely as the cause of his fever; also noted with leukocytosis as this is probably reactive to infection  - follow up cultures -- found to have gram negative rods in urine; awaiting sensi  - serial cbc and monitor temperature   - reviewed prior medical records to evaluate for resistant or atypical pathogens   - iv hydration and supportive care   - will continue ceftriaxone as ordered; probably a component of prostatitis as well  - consideration for urology evaluation  - will most likely will need a longer course of antibiotics, but oral when ready for discharge  2. other issues: per medicine

## 2021-12-23 NOTE — PROGRESS NOTE ADULT - NUTRITIONAL ASSESSMENT
This patient has been assessed with a concern for Malnutrition and has been determined to have a diagnosis/diagnoses of Severe protein-calorie malnutrition.    This patient is being managed with:   Diet Regular-  Entered: Dec 21 2021  8:16PM

## 2021-12-23 NOTE — PROGRESS NOTE ADULT - ASSESSMENT
61 year old male patient with intermittent fevers for 3 weeks admitted with sepsis of urinary origin     # Sepsis of urinary origin    - bld cx prelim negative  - leukocytosis resolved and pt feeling much better. will f/u ucx and dc home on oral abx once cx results  -give IVF hydration in the ED  -Rocephin IV continue   -monitor temps  - monitor CBC   - Ct scan noted    #Hyponatremia- resolved   -trial of IVF hydration  -trend sodium    # Hx of Hyperthyroidism  -pt with unintentional weight loss despite being compliant with meds  -also tachycardic and diaphoretic  -unsure if above infection contributing   -get routine endocrinology evaluation  -TSH <0.01- pending T3 and T4  - restart methimazole     #HTN  -on valsartan    #DVT ppx  -encourage ambulation    Case d/w team on IDR.

## 2021-12-24 VITALS
TEMPERATURE: 98 F | HEART RATE: 88 BPM | DIASTOLIC BLOOD PRESSURE: 72 MMHG | RESPIRATION RATE: 18 BRPM | OXYGEN SATURATION: 99 % | SYSTOLIC BLOOD PRESSURE: 114 MMHG

## 2021-12-24 LAB
-  AMIKACIN: SIGNIFICANT CHANGE UP
-  AMOXICILLIN/CLAVULANIC ACID: SIGNIFICANT CHANGE UP
-  AMPICILLIN/SULBACTAM: SIGNIFICANT CHANGE UP
-  AMPICILLIN: SIGNIFICANT CHANGE UP
-  AZTREONAM: SIGNIFICANT CHANGE UP
-  CEFAZOLIN: SIGNIFICANT CHANGE UP
-  CEFEPIME: SIGNIFICANT CHANGE UP
-  CEFOXITIN: SIGNIFICANT CHANGE UP
-  CEFTRIAXONE: SIGNIFICANT CHANGE UP
-  CIPROFLOXACIN: SIGNIFICANT CHANGE UP
-  ERTAPENEM: SIGNIFICANT CHANGE UP
-  GENTAMICIN: SIGNIFICANT CHANGE UP
-  IMIPENEM: SIGNIFICANT CHANGE UP
-  LEVOFLOXACIN: SIGNIFICANT CHANGE UP
-  MEROPENEM: SIGNIFICANT CHANGE UP
-  NITROFURANTOIN: SIGNIFICANT CHANGE UP
-  PIPERACILLIN/TAZOBACTAM: SIGNIFICANT CHANGE UP
-  TIGECYCLINE: SIGNIFICANT CHANGE UP
-  TOBRAMYCIN: SIGNIFICANT CHANGE UP
-  TRIMETHOPRIM/SULFAMETHOXAZOLE: SIGNIFICANT CHANGE UP
ANA TITR SER: NEGATIVE — SIGNIFICANT CHANGE UP
CULTURE RESULTS: SIGNIFICANT CHANGE UP
METHOD TYPE: SIGNIFICANT CHANGE UP
ORGANISM # SPEC MICROSCOPIC CNT: SIGNIFICANT CHANGE UP
ORGANISM # SPEC MICROSCOPIC CNT: SIGNIFICANT CHANGE UP
SPECIMEN SOURCE: SIGNIFICANT CHANGE UP

## 2021-12-24 PROCEDURE — 99239 HOSP IP/OBS DSCHRG MGMT >30: CPT

## 2021-12-24 RX ORDER — CX-024414 0.2 MG/ML
0.5 INJECTION, SUSPENSION INTRAMUSCULAR
Qty: 0 | Refills: 0 | DISCHARGE

## 2021-12-24 RX ORDER — CEFUROXIME AXETIL 250 MG
1 TABLET ORAL
Qty: 28 | Refills: 0
Start: 2021-12-24 | End: 2022-01-06

## 2021-12-24 RX ORDER — TAMSULOSIN HYDROCHLORIDE 0.4 MG/1
1 CAPSULE ORAL
Qty: 30 | Refills: 0
Start: 2021-12-24 | End: 2022-01-22

## 2021-12-24 RX ORDER — INFLUENZA VIRUS VACCINE 15; 15; 15; 15 UG/.5ML; UG/.5ML; UG/.5ML; UG/.5ML
0.5 SUSPENSION INTRAMUSCULAR
Qty: 0 | Refills: 0 | DISCHARGE

## 2021-12-24 RX ORDER — METHIMAZOLE 10 MG/1
1 TABLET ORAL
Qty: 30 | Refills: 0
Start: 2021-12-24 | End: 2022-01-22

## 2021-12-24 RX ADMIN — Medication 2000 UNIT(S): at 09:28

## 2021-12-24 RX ADMIN — PREGABALIN 1000 MICROGRAM(S): 225 CAPSULE ORAL at 09:27

## 2021-12-24 RX ADMIN — VALSARTAN 40 MILLIGRAM(S): 80 TABLET ORAL at 09:28

## 2021-12-24 RX ADMIN — Medication 25 MILLIGRAM(S): at 09:28

## 2021-12-24 NOTE — DISCHARGE NOTE PROVIDER - CARE PROVIDER_API CALL
endocrinology,   Phone: (   )    -  Fax: (   )    -  Follow Up Time:     urology,   Phone: (   )    -  Fax: (   )    -  Follow Up Time:     primary doctor,   Phone: (   )    -  Fax: (   )    -  Follow Up Time:

## 2021-12-24 NOTE — DISCHARGE NOTE NURSING/CASE MANAGEMENT/SOCIAL WORK - NSDCPEFALRISK_GEN_ALL_CORE
For information on Fall & Injury Prevention, visit: [email or web address] OR  [email or web address] OR  https://www.cdc.gov/steadi/patient.html

## 2021-12-24 NOTE — DISCHARGE NOTE PROVIDER - HOSPITAL COURSE
61 year old male patient with pertinent past medical history of Hyperthyroidism presented to the ED complaining of intermittent fevers( tmax of 103) associated with chills, diaphoresis, loose stools, headache, urinary urgency and frequency. Of note, patient also with unintentional 20 pound weight loss over the past 2 months. Endorses being compliant with his methimazole. CT abdomen showed mild bilateral perinpehric stranding without Hydro- patient admitted with UTI and IV antibiotic was started.   61 year old male patient with intermittent fevers for 3 weeks admitted with sepsis of urinary origin     #Pyelonephritis  without hydronephrosis /prostatitis    - bld cx NGTD, Ucx ecoli sensitivities pending   per ID ok to dc home on ceftin 500 BID for 14 days   I dw with Dr Ramirez- Dr ramirez will follow up culture sensitivity on monday; if need be he can return for iv antibiotics if resistant organism; also if he feels worse he should return to ED/hospital for further evaluation; patient understands and is acceptable to the plan  - leukocytosis resolved and pt feeling much better.   s/p  IVF hydration  s/p Rocephin IV       #Hyponatremia secondary to dehydration resolved   s/p IVF hydration      # Hx of Hyperthyroidism-pt with unintentional weight loss despite being compliant with meds  -also tachycardic and diaphoretic on admission- resolved   -probably infection contributing   TSH <0.01- T3 and T4  - restarted methimazole   f/u with PMD  and endocrinology in1  week     #HTN  -on valsartan    #DVT ppx  -encourage ambulation    Case d/w team on IDR.     .  12/24 - feels well. no incontinence, fever or chills , eager to go home , afebrile, no abd pain , leukocytosis resolved     PE:  Constitutional: NAD, laying in bed  HEENT: NC/AT  Back: no tenderness  Respiratory: respirations even and non labored, LCTA  Cardiovascular: S1S2 regular, no murmurs  Abdomen: soft, not tender, not distended, positive BS  Genitourinary: voiding  Rectal: deferred  Musculoskeletal: no muscle tenderness, no joint swelling or tenderness  Extremities: no pedal edema   Neurological: no focal deficits    discharge time 47mins

## 2021-12-24 NOTE — DISCHARGE NOTE NURSING/CASE MANAGEMENT/SOCIAL WORK - PATIENT PORTAL LINK FT
You can access the FollowMyHealth Patient Portal offered by [facility] by registering at the following website: [email or web address]. By joining [facility]’s FollowMyHealth portal, you will also be able to view your health information using other applications (apps) compatible with our system.

## 2021-12-24 NOTE — PROGRESS NOTE ADULT - SUBJECTIVE AND OBJECTIVE BOX
Date of service: 12-23-21 @ 15:31    Patient lying in bed  Still with intermittent fevers, no back pain, would like to go home, but wants to be better first        ROS unable to obtain secondary to patient medical condition     MEDICATIONS  (STANDING):  atorvastatin 20 milliGRAM(s) Oral at bedtime  cefTRIAXone   IVPB 1000 milliGRAM(s) IV Intermittent every 24 hours  cholecalciferol Oral Tab/Cap - Peds 2000 Unit(s) Oral daily  cyanocobalamin 1000 MICROGram(s) Oral daily  enoxaparin Injectable 40 milliGRAM(s) SubCutaneous daily  methimazole 5 milliGRAM(s) Oral daily  metoprolol succinate ER 25 milliGRAM(s) Oral daily  tamsulosin 0.4 milliGRAM(s) Oral at bedtime  valsartan 40 milliGRAM(s) Oral daily    MEDICATIONS  (PRN):  acetaminophen     Tablet .. 650 milliGRAM(s) Oral every 6 hours PRN Temp greater or equal to 38C (100.4F), Mild Pain (1 - 3)  aluminum hydroxide/magnesium hydroxide/simethicone Suspension 30 milliLiter(s) Oral every 4 hours PRN Dyspepsia  artificial  tears Solution 1 Drop(s) Both EYES every 4 hours PRN Dry Eyes  melatonin 3 milliGRAM(s) Oral at bedtime PRN Insomnia  ondansetron Injectable 4 milliGRAM(s) IV Push every 8 hours PRN Nausea and/or Vomiting      Vital Signs Last 24 Hrs  T(C): 36.4 (23 Dec 2021 10:00), Max: 38.3 (22 Dec 2021 18:55)  T(F): 97.5 (23 Dec 2021 10:00), Max: 101 (22 Dec 2021 18:55)  HR: 82 (23 Dec 2021 10:00) (75 - 114)  BP: 121/75 (23 Dec 2021 10:00) (111/63 - 135/72)  BP(mean): --  RR: 18 (23 Dec 2021 10:00) (18 - 19)  SpO2: 97% (23 Dec 2021 10:00) (94% - 100%)        Physical Exam:        Constitutional: frail looking  HEENT: NC/AT, EOMI, PERRLA, conjunctivae clear; ears and nose atraumatic; pharynx clear  Neck: supple; thyroid not palpable  Back: no tenderness  Respiratory: respiratory effort normal; clear to auscultation  Cardiovascular: S1S2 regular, no murmurs  Abdomen: soft, not tender, not distended, positive BS; no liver or spleen organomegaly  Genitourinary: no suprapubic tenderness, no CVA tenderness  Musculoskeletal: no muscle tenderness, no joint swelling or tenderness  Neurological/ Psychiatric: AxOx3, judgement and insight normal;  moving all extremities  Skin: no rashes; no palpable lesions    Labs: all available labs reviewed                          Labs:                        13.7   5.40  )-----------( 218      ( 23 Dec 2021 07:01 )             39.7     12-23    137  |  107  |  15  ----------------------------<  116<H>  3.7   |  23  |  0.79    Ca    9.7      23 Dec 2021 07:01  Phos  1.8     12-21  Mg     2.0     12-21    TPro  6.3  /  Alb  2.7<L>  /  TBili  0.9  /  DBili  x   /  AST  14<L>  /  ALT  25  /  AlkPhos  60  12-22           Cultures:       Culture - Blood (collected 12-21-21 @ 18:45)  Source: .Blood None  Preliminary Report (12-23-21 @ 03:02):    No growth to date.    Culture - Blood (collected 12-21-21 @ 18:44)  Source: .Blood None  Preliminary Report (12-23-21 @ 03:02):    No growth to date.    Culture - Urine (collected 12-21-21 @ 17:19)  Source: Clean Catch Clean Catch (Midstream)  Preliminary Report (12-23-21 @ 12:52):    >100,000 CFU/ml Gram Negative Rods      C-Reactive Protein, Serum: 148 mg/L (12-22-21 @ 06:55)          < from: CT Abdomen and Pelvis No Cont (12.22.21 @ 00:03) >    ACC: [id number] EXAM:  CT ABDOMEN AND PELVIS                          PROCEDURE DATE:  12/22/2021          INTERPRETATION:  CLINICAL INFORMATION: Fever.    PROCEDURE:  Helical axial images were obtained from the domes of the diaphragm   through the pubic symphysis without intravenous or oral contrast. Coronal   and sagittal reformats were also obtained.    CONTRAST/COMPLICATIONS:  IV Contrast: NONE  Oral Contrast: NONE  Complications: None reported at time of study completion    COMPARISON: None.    FINDINGS: Evaluation of the abdominal/pelvic organs, viscera and   vasculature is limited without intravenous contrast.    LOWER CHEST: Mild atelectasis.    LIVER: Unremarkable.  GALLBLADDER/BILE DUCTS: No intrahepatic or extrahepatic biliary   dilatation. No radiopaque gallstone.  PANCREAS: Diffuse fatty atrophy.  SPLEEN: Unremarkable.    ADRENALS: Unremarkable.  KIDNEYS/URETERS: Nonspecific mild bilateral perinephric stranding without   hydroureteronephrosis. No obstructing radiopaque urinary tract stone.   Nonobstructing 0.2 x 0.3 cm left renal stone. A 1.0 x 1.0 cm right renal   cyst.  BLADDER: Partially distended. Prominent wall.  REPRODUCTIVE ORGANS: Enlarged prostate. Nonspecific stranding surrounding   the seminal vesicles/prostate.    BOWEL: No bowel obstruction. Unremarkable appendix.  PERITONEUM: No drainable fluid collection or free air.  VESSELS: Atherosclerosis. Normal caliber of the abdominal aorta.  RETROPERITONEUM: Subcentimeter lymph nodes without lymphadenopathy.  ABDOMINAL WALL/SOFT TISSUES: Small fat-containing inguinal hernias.  BONES: Degenerative changes of the spine. Chronic anterior wedging of the   thoracolumbar spine.    IMPRESSION:    Nonspecific mild bilateral perinephric stranding without   hydroureteronephrosis or obstructing radiopaque urinary tract stone.   Prominent bladder wall. Nonspecific stranding surrounding the seminal   vesicles/prostate. Recommend clinical correlation to assess urinary tract   infection/prostatitis/seminal vesiculitis.    Additional findings as described.    < end of copied text >      Radiology: all available radiological tests reviewed    Advanced directives addressed: full resuscitation
  Date of service: 12-24-21 @ 12:18      Patient sitting in chair; eating lunch; afebrile      ROS: no fever or chills; denies dizziness, no HA, no SOB or cough, no abdominal pain, no diarrhea or constipation; no dysuria, no urinary frequency, no legs pain, no rashes    MEDICATIONS  (STANDING):  atorvastatin 20 milliGRAM(s) Oral at bedtime  cefTRIAXone   IVPB 1000 milliGRAM(s) IV Intermittent every 24 hours  cholecalciferol Oral Tab/Cap - Peds 2000 Unit(s) Oral daily  cyanocobalamin 1000 MICROGram(s) Oral daily  enoxaparin Injectable 40 milliGRAM(s) SubCutaneous daily  methimazole 5 milliGRAM(s) Oral daily  metoprolol succinate ER 25 milliGRAM(s) Oral daily  tamsulosin 0.4 milliGRAM(s) Oral at bedtime  valsartan 40 milliGRAM(s) Oral daily    MEDICATIONS  (PRN):  acetaminophen     Tablet .. 650 milliGRAM(s) Oral every 6 hours PRN Temp greater or equal to 38C (100.4F), Mild Pain (1 - 3)  aluminum hydroxide/magnesium hydroxide/simethicone Suspension 30 milliLiter(s) Oral every 4 hours PRN Dyspepsia  artificial  tears Solution 1 Drop(s) Both EYES every 4 hours PRN Dry Eyes  melatonin 3 milliGRAM(s) Oral at bedtime PRN Insomnia  ondansetron Injectable 4 milliGRAM(s) IV Push every 8 hours PRN Nausea and/or Vomiting      Vital Signs Last 24 Hrs  T(C): 36.7 (24 Dec 2021 08:27), Max: 36.7 (24 Dec 2021 08:27)  T(F): 98 (24 Dec 2021 08:27), Max: 98 (24 Dec 2021 08:27)  HR: 88 (24 Dec 2021 08:27) (82 - 88)  BP: 114/72 (24 Dec 2021 08:27) (112/78 - 116/77)  BP(mean): --  RR: 18 (24 Dec 2021 08:27) (18 - 19)  SpO2: 99% (24 Dec 2021 08:27) (95% - 99%)        Physical Exam:      Constitutional: frail looking  HEENT: NC/AT, EOMI, PERRLA, conjunctivae clear; ears and nose atraumatic; pharynx clear  Neck: supple; thyroid not palpable  Back: no tenderness  Respiratory: respiratory effort normal; clear to auscultation  Cardiovascular: S1S2 regular, no murmurs  Abdomen: soft, not tender, not distended, positive BS; no liver or spleen organomegaly  Genitourinary: no suprapubic tenderness, no CVA tenderness  Musculoskeletal: no muscle tenderness, no joint swelling or tenderness  Neurological/ Psychiatric: AxOx3, judgement and insight normal;  moving all extremities  Skin: no rashes; no palpable lesions    Labs: all available labs reviewed                        Labs:                        13.7   5.40  )-----------( 218      ( 23 Dec 2021 07:01 )             39.7     12-23    137  |  107  |  15  ----------------------------<  116<H>  3.7   |  23  |  0.79    Ca    9.7      23 Dec 2021 07:01             Cultures:       Culture - Blood (collected 12-21-21 @ 18:45)  Source: .Blood None  Preliminary Report (12-23-21 @ 03:02):    No growth to date.    Culture - Blood (collected 12-21-21 @ 18:44)  Source: .Blood None  Preliminary Report (12-23-21 @ 03:02):    No growth to date.    Culture - Urine (collected 12-21-21 @ 17:19)  Source: Clean Catch Clean Catch (Midstream)  Preliminary Report (12-23-21 @ 21:45):    >100,000 CFU/ml Escherichia coli      C-Reactive Protein, Serum: 148 mg/L (12-22-21 @ 06:55)          < from: CT Abdomen and Pelvis No Cont (12.22.21 @ 00:03) >    ACC: [id number] EXAM:  CT ABDOMEN AND PELVIS                          PROCEDURE DATE:  12/22/2021          INTERPRETATION:  CLINICAL INFORMATION: Fever.    PROCEDURE:  Helical axial images were obtained from the domes of the diaphragm   through the pubic symphysis without intravenous or oral contrast. Coronal   and sagittal reformats were also obtained.    CONTRAST/COMPLICATIONS:  IV Contrast: NONE  Oral Contrast: NONE  Complications: None reported at time of study completion    COMPARISON: None.    FINDINGS: Evaluation of the abdominal/pelvic organs, viscera and   vasculature is limited without intravenous contrast.    LOWER CHEST: Mild atelectasis.    LIVER: Unremarkable.  GALLBLADDER/BILE DUCTS: No intrahepatic or extrahepatic biliary   dilatation. No radiopaque gallstone.  PANCREAS: Diffuse fatty atrophy.  SPLEEN: Unremarkable.    ADRENALS: Unremarkable.  KIDNEYS/URETERS: Nonspecific mild bilateral perinephric stranding without   hydroureteronephrosis. No obstructing radiopaque urinary tract stone.   Nonobstructing 0.2 x 0.3 cm left renal stone. A 1.0 x 1.0 cm right renal   cyst.  BLADDER: Partially distended. Prominent wall.  REPRODUCTIVE ORGANS: Enlarged prostate. Nonspecific stranding surrounding   the seminal vesicles/prostate.    BOWEL: No bowel obstruction. Unremarkable appendix.  PERITONEUM: No drainable fluid collection or free air.  VESSELS: Atherosclerosis. Normal caliber of the abdominal aorta.  RETROPERITONEUM: Subcentimeter lymph nodes without lymphadenopathy.  ABDOMINAL WALL/SOFT TISSUES: Small fat-containing inguinal hernias.  BONES: Degenerative changes of the spine. Chronic anterior wedging of the   thoracolumbar spine.    IMPRESSION:    Nonspecific mild bilateral perinephric stranding without   hydroureteronephrosis or obstructing radiopaque urinary tract stone.   Prominent bladder wall. Nonspecific stranding surrounding the seminal   vesicles/prostate. Recommend clinical correlation to assess urinary tract   infection/prostatitis/seminal vesiculitis.    Additional findings as described.    < end of copied text >      Radiology: all available radiological tests reviewed    Advanced directives addressed: full resuscitation
  61 year old male patient with pertinent past medical history of Hyperthyroidism presented to the ED complaining of intermittent fevers( tmax of 103) associated with chills, diaphoresis, loose stools, headache, urinary urgency and frequency. Of note, patient also with unintentional 20 pound weight loss over the past 2 months. Endorses being compliant with his methimazole. CT abdomen showed mild bilateral perinpehric stranding without Hydro- patient admitted with UTI and IV antibiotic was started.     - patient was seen and examined. Denies fever, pain or chills. Worried about his TSH level. stated that he had been compliant with his thyroid medication. Felt that he was retaining urine- will check bladder scan.   - feels well. no incontinence, fever or chills     Vital Signs Last 24 Hrs  T(C): 36.4 (23 Dec 2021 10:00), Max: 38.3 (22 Dec 2021 18:55)  T(F): 97.5 (23 Dec 2021 10:00), Max: 101 (22 Dec 2021 18:55)  HR: 82 (23 Dec 2021 10:00) (75 - 114)  BP: 121/75 (23 Dec 2021 10:00) (111/63 - 135/72)  BP(mean): --  RR: 18 (23 Dec 2021 10:00) (18 - 19)  SpO2: 97% (23 Dec 2021 10:00) (94% - 100%)    ROS:   All 10 systems reviewed and found to be negative with the exception of what has been described above.       PE:  Constitutional: NAD, laying in bed  HEENT: NC/AT  Back: no tenderness  Respiratory: respirations even and non labored, LCTA  Cardiovascular: S1S2 regular, no murmurs  Abdomen: soft, not tender, not distended, positive BS  Genitourinary: voiding  Rectal: deferred  Musculoskeletal: no muscle tenderness, no joint swelling or tenderness  Extremities: no pedal edema   Neurological: no focal deficits                              13.7   5.40  )-----------( 218      ( 23 Dec 2021 07:01 )             39.7         137  |  107  |  15  ----------------------------<  116<H>  3.7   |  23  |  0.79    Ca    9.7      23 Dec 2021 07:01  Phos  1.8     12-  Mg     2.0     12-    TPro  6.3  /  Alb  2.7<L>  /  TBili  0.9  /  DBili  x   /  AST  14<L>  /  ALT  25  /  AlkPhos  60  12-        LIVER FUNCTIONS - ( 22 Dec 2021 06:55 )  Alb: 2.7 g/dL / Pro: 6.3 gm/dL / ALK PHOS: 60 U/L / ALT: 25 U/L / AST: 14 U/L / GGT: x           PT/INR - ( 22 Dec 2021 06:55 )   PT: 15.1 sec;   INR: 1.32 ratio           Urinalysis Basic - ( 21 Dec 2021 17:19 )    Color: Yellow / Appearance: Clear / S.025 / pH: x  Gluc: x / Ketone: Large  / Bili: Negative / Urobili: Negative mg/dL   Blood: x / Protein: 15 mg/dL / Nitrite: Positive   Leuk Esterase: Trace / RBC: 6-10 /HPF / WBC 11-25   Sq Epi: x / Non Sq Epi: Occasional / Bacteria: Many                                
61 year old male patient with pertinent past medical history of Hyperthyroidism presented to the ED complaining of intermittent fevers( tmax of 103) associated with chills, diaphoresis, loose stools, headache, urinary urgency and frequency. Of note, patient also with unintentional 20 pound weight loss over the past 2 months. Endorses being compliant with his methimazole. CT abdomen showed mild bilateral perinpehric stranding without Hydro- patient admitted with UTI and IV antibiotic was started.     12/22- patient was seen and examined. Denies fever, pain or chills. Worried about his TSH level. stated that he had been compliant with his thyroid medication. Felt that he was retaining urine- will check bladder scan.    Vital Signs Last 24 Hrs  T(C): 36.7 (22 Dec 2021 08:34), Max: 39.4 (21 Dec 2021 15:45)  T(F): 98 (22 Dec 2021 08:34), Max: 103 (21 Dec 2021 15:45)  HR: 97 (22 Dec 2021 08:34) (68 - 112)  BP: 124/70 (22 Dec 2021 08:34) (112/79 - 124/78)  BP(mean): 90 (21 Dec 2021 23:20) (88 - 90)  RR: 18 (22 Dec 2021 08:34) (17 - 20)  SpO2: 96% (22 Dec 2021 08:34) (93% - 99%)    ROS:   All 10 systems reviewed and found to be negative with the exception of what has been described above.       PE:  Constitutional: NAD, laying in bed  HEENT: NC/AT  Back: no tenderness  Respiratory: respirations even and non labored, LCTA  Cardiovascular: S1S2 regular, no murmurs  Abdomen: soft, not tender, not distended, positive BS  Genitourinary: voiding  Rectal: deferred  Musculoskeletal: no muscle tenderness, no joint swelling or tenderness  Extremities: no pedal edema   Neurological: no focal deficits      MEDICATIONS  (STANDING):  atorvastatin 20 milliGRAM(s) Oral at bedtime  cefTRIAXone   IVPB 1000 milliGRAM(s) IV Intermittent every 24 hours  cholecalciferol Oral Tab/Cap - Peds 2000 Unit(s) Oral daily  cyanocobalamin 1000 MICROGram(s) Oral daily  metoprolol succinate ER 25 milliGRAM(s) Oral daily  sodium chloride 0.9%. 1000 milliLiter(s) (100 mL/Hr) IV Continuous <Continuous>  valsartan 40 milliGRAM(s) Oral daily    MEDICATIONS  (PRN):  acetaminophen     Tablet .. 650 milliGRAM(s) Oral every 6 hours PRN Temp greater or equal to 38C (100.4F), Mild Pain (1 - 3)  aluminum hydroxide/magnesium hydroxide/simethicone Suspension 30 milliLiter(s) Oral every 4 hours PRN Dyspepsia  artificial  tears Solution 1 Drop(s) Both EYES every 4 hours PRN Dry Eyes  melatonin 3 milliGRAM(s) Oral at bedtime PRN Insomnia  ondansetron Injectable 4 milliGRAM(s) IV Push every 8 hours PRN Nausea and/or Vomiting      12-22    137  |  107  |  22  ----------------------------<  118<H>  3.6   |  22  |  0.82    Ca    9.6      22 Dec 2021 06:55  Phos  1.8     12-21  Mg     2.0     12-21    TPro  6.3  /  Alb  2.7<L>  /  TBili  0.9  /  DBili  x   /  AST  14<L>  /  ALT  25  /  AlkPhos  60  12-22                        13.1   10.53 )-----------( 218      ( 22 Dec 2021 06:55 )             38.6

## 2021-12-24 NOTE — DISCHARGE NOTE PROVIDER - NSDCCPCAREPLAN_GEN_ALL_CORE_FT
PRINCIPAL DISCHARGE DIAGNOSIS  Diagnosis: Fever  Assessment and Plan of Treatment: you were treated for UTI, pyelonephritis and prostatitis- please follow up with urology as outpatient for enlarged prostate   you have been prescribed oral antibiotic cefuroxime for 14 days, if you have recurrence of fever or have recurrence of symptoms such as abdominal pain orpainful urination or nausea/vomiting return to ER  your methimazole has been restarted due to elevated T3 and T4 levels- please see endocrinology for follow up in 1 week      SECONDARY DISCHARGE DIAGNOSES  Diagnosis: Low TSH level  Assessment and Plan of Treatment:     Diagnosis: Acute UTI  Assessment and Plan of Treatment:      PRINCIPAL DISCHARGE DIAGNOSIS  Diagnosis: Fever  Assessment and Plan of Treatment: you were treated for UTI, pyelonephritis and prostatitis- please follow up with urology as outpatient for enlarged prostate   you have been prescribed oral antibiotic cefuroxime for 14 days, if you have recurrence of fever or have recurrence of symptoms such as abdominal pain orpainful urination or nausea/vomiting return to ER  your methimazole has been restarted due to hyperthyroidism , TSH <0.01levels- please see endocrinology for follow up in 1 week      SECONDARY DISCHARGE DIAGNOSES  Diagnosis: Low TSH level  Assessment and Plan of Treatment:     Diagnosis: Acute UTI  Assessment and Plan of Treatment:

## 2021-12-24 NOTE — DISCHARGE NOTE PROVIDER - NSDCMRMEDTOKEN_GEN_ALL_CORE_FT
cefuroxime 500 mg oral tablet: 1 tab(s) orally every 12 hours  Dry Eye Relief ophthalmic solution: 1 drop(s) to each affected eye once a day, As Needed  methIMAzole 5 mg oral tablet: 1 tab(s) orally once a day  Metoprolol Succinate ER 25 mg oral tablet, extended release: 1 tab(s) orally once a day  rosuvastatin 5 mg oral tablet: 1 tab(s) orally once a day  tamsulosin 0.4 mg oral capsule: 1 cap(s) orally once a day (at bedtime)  valsartan 40 mg oral tablet: 1 tab(s) orally once a day  Vitamin B-12 1000 mcg sublingual tablet: 1 tab(s) sublingual once a day  Vitamin D3 50 mcg (2000 intl units) oral tablet: 2 tab(s) orally once a day

## 2021-12-24 NOTE — DISCHARGE NOTE PROVIDER - PROVIDER TOKENS
FREE:[LAST:[endocrinology],PHONE:[(   )    -],FAX:[(   )    -]],FREE:[LAST:[urology],PHONE:[(   )    -],FAX:[(   )    -]],FREE:[LAST:[primary doctor],PHONE:[(   )    -],FAX:[(   )    -]]

## 2021-12-24 NOTE — PROGRESS NOTE ADULT - ASSESSMENT
61 year old male patient with pertinent past medical history of Hyperthyroidism admitted on 12/21 for evaluation of fever to 103, chills, sweats, headache and urinary frequency and urgency; the patient notes that at night there is more sweating, but was not sure if this was due to his hyperthyroidism. The patient is feeling better since admission and having received iv antibiotics. Was vaccinated against COVID-19 infection as well as influenza. Was not on any antibiotics prior for urinary infection or hospitalized prior for infections.     1. Patient admitted with pyelonephritis, most likely as the cause of his fever; also noted with leukocytosis as this is probably reactive to infection  - follow up cultures -- found to have E coli in urine; awaiting sensi  - okay from id standpoint to discharge home on ceftin 500 mg po q 12 hours for 14 days  - will follow up culture sensitivity on monday; if need be he can return for iv antibiotics if resistant organism; also if he feels worse he should return to ED/hospital for further evaluation; patient understands and is acceptable to the plan  2. other issues: per medicine

## 2021-12-27 LAB
CULTURE RESULTS: SIGNIFICANT CHANGE UP
CULTURE RESULTS: SIGNIFICANT CHANGE UP
SPECIMEN SOURCE: SIGNIFICANT CHANGE UP
SPECIMEN SOURCE: SIGNIFICANT CHANGE UP

## 2021-12-28 LAB
B MICROTI IGG TITR SER: SIGNIFICANT CHANGE UP
B MICROTI IGM TITR SER: SIGNIFICANT CHANGE UP

## 2022-01-03 DIAGNOSIS — A41.9 SEPSIS, UNSPECIFIED ORGANISM: ICD-10-CM

## 2022-01-03 DIAGNOSIS — N10 ACUTE PYELONEPHRITIS: ICD-10-CM

## 2022-01-03 DIAGNOSIS — E05.90 THYROTOXICOSIS, UNSPECIFIED WITHOUT THYROTOXIC CRISIS OR STORM: ICD-10-CM

## 2022-01-03 DIAGNOSIS — E87.1 HYPO-OSMOLALITY AND HYPONATREMIA: ICD-10-CM

## 2022-01-03 DIAGNOSIS — N41.9 INFLAMMATORY DISEASE OF PROSTATE, UNSPECIFIED: ICD-10-CM

## 2022-01-03 DIAGNOSIS — E78.5 HYPERLIPIDEMIA, UNSPECIFIED: ICD-10-CM

## 2022-01-03 DIAGNOSIS — E43 UNSPECIFIED SEVERE PROTEIN-CALORIE MALNUTRITION: ICD-10-CM

## 2022-01-03 DIAGNOSIS — B96.20 UNSPECIFIED ESCHERICHIA COLI [E. COLI] AS THE CAUSE OF DISEASES CLASSIFIED ELSEWHERE: ICD-10-CM

## 2022-01-03 DIAGNOSIS — E86.0 DEHYDRATION: ICD-10-CM

## 2022-01-03 DIAGNOSIS — Z86.19 PERSONAL HISTORY OF OTHER INFECTIOUS AND PARASITIC DISEASES: ICD-10-CM

## 2022-01-03 DIAGNOSIS — I10 ESSENTIAL (PRIMARY) HYPERTENSION: ICD-10-CM

## 2022-01-03 DIAGNOSIS — N40.0 BENIGN PROSTATIC HYPERPLASIA WITHOUT LOWER URINARY TRACT SYMPTOMS: ICD-10-CM

## 2022-01-03 DIAGNOSIS — M10.9 GOUT, UNSPECIFIED: ICD-10-CM

## 2023-01-01 NOTE — PROGRESS NOTE ADULT - REASON FOR ADMISSION
Fever of Unknown origin  Urosepsis  UTI
Fever of Unknown origin  Urosepsis  UTI
Fever/ UTI
Fever of Unknown origin  Urosepsis  UTI
Infant born at 0043 on [birth year], , labs negative rubella immune. GBS as of 10/5/23 positive ad tx with vancomycin 3x last dose given on 10/27/23 at . EOS 0.75, maternal temp post delivery.
coffee

## 2023-04-17 ENCOUNTER — EMERGENCY (EMERGENCY)
Facility: HOSPITAL | Age: 63
LOS: 1 days | Discharge: HOME | End: 2023-04-17
Attending: EMERGENCY MEDICINE
Payer: COMMERCIAL

## 2023-04-17 VITALS
HEIGHT: 72 IN | SYSTOLIC BLOOD PRESSURE: 173 MMHG | WEIGHT: 272.05 LBS | DIASTOLIC BLOOD PRESSURE: 116 MMHG | TEMPERATURE: 98 F | HEART RATE: 74 BPM | OXYGEN SATURATION: 95 % | RESPIRATION RATE: 18 BRPM

## 2023-04-17 LAB
ALBUMIN SERPL ELPH-MCNC: 4.8 G/DL — SIGNIFICANT CHANGE UP (ref 3.3–5)
ALP SERPL-CCNC: 62 U/L — SIGNIFICANT CHANGE UP (ref 40–120)
ALT FLD-CCNC: 33 U/L — SIGNIFICANT CHANGE UP (ref 10–45)
ANION GAP SERPL CALC-SCNC: 10 MMOL/L — SIGNIFICANT CHANGE UP (ref 5–17)
APTT BLD: 33.8 SEC — SIGNIFICANT CHANGE UP (ref 27.5–35.5)
AST SERPL-CCNC: 31 U/L — SIGNIFICANT CHANGE UP (ref 10–40)
BASE EXCESS BLDV CALC-SCNC: 2.3 MMOL/L — SIGNIFICANT CHANGE UP (ref -2–3)
BASOPHILS # BLD AUTO: 0.08 K/UL — SIGNIFICANT CHANGE UP (ref 0–0.2)
BASOPHILS NFR BLD AUTO: 1 % — SIGNIFICANT CHANGE UP (ref 0–2)
BILIRUB SERPL-MCNC: 0.5 MG/DL — SIGNIFICANT CHANGE UP (ref 0.2–1.2)
BUN SERPL-MCNC: 13 MG/DL — SIGNIFICANT CHANGE UP (ref 7–23)
CA-I SERPL-SCNC: 1.38 MMOL/L — HIGH (ref 1.15–1.33)
CALCIUM SERPL-MCNC: 10.9 MG/DL — HIGH (ref 8.4–10.5)
CHLORIDE BLDV-SCNC: 103 MMOL/L — SIGNIFICANT CHANGE UP (ref 96–108)
CHLORIDE SERPL-SCNC: 105 MMOL/L — SIGNIFICANT CHANGE UP (ref 96–108)
CO2 BLDV-SCNC: 30 MMOL/L — HIGH (ref 22–26)
CO2 SERPL-SCNC: 24 MMOL/L — SIGNIFICANT CHANGE UP (ref 22–31)
CREAT SERPL-MCNC: 0.98 MG/DL — SIGNIFICANT CHANGE UP (ref 0.5–1.3)
EGFR: 87 ML/MIN/1.73M2 — SIGNIFICANT CHANGE UP
EOSINOPHIL # BLD AUTO: 0.17 K/UL — SIGNIFICANT CHANGE UP (ref 0–0.5)
EOSINOPHIL NFR BLD AUTO: 2.1 % — SIGNIFICANT CHANGE UP (ref 0–6)
GAS PNL BLDV: 135 MMOL/L — LOW (ref 136–145)
GAS PNL BLDV: SIGNIFICANT CHANGE UP
GLUCOSE BLDV-MCNC: 91 MG/DL — SIGNIFICANT CHANGE UP (ref 70–99)
GLUCOSE SERPL-MCNC: 95 MG/DL — SIGNIFICANT CHANGE UP (ref 70–99)
HCO3 BLDV-SCNC: 29 MMOL/L — SIGNIFICANT CHANGE UP (ref 22–29)
HCT VFR BLD CALC: 46.6 % — SIGNIFICANT CHANGE UP (ref 39–50)
HCT VFR BLDA CALC: 44 % — SIGNIFICANT CHANGE UP (ref 39–51)
HGB BLD CALC-MCNC: 14.7 G/DL — SIGNIFICANT CHANGE UP (ref 12.6–17.4)
HGB BLD-MCNC: 15.9 G/DL — SIGNIFICANT CHANGE UP (ref 13–17)
IMM GRANULOCYTES NFR BLD AUTO: 0.4 % — SIGNIFICANT CHANGE UP (ref 0–0.9)
INR BLD: 1.06 RATIO — SIGNIFICANT CHANGE UP (ref 0.88–1.16)
LACTATE BLDV-MCNC: 1.1 MMOL/L — SIGNIFICANT CHANGE UP (ref 0.5–2)
LYMPHOCYTES # BLD AUTO: 2.72 K/UL — SIGNIFICANT CHANGE UP (ref 1–3.3)
LYMPHOCYTES # BLD AUTO: 34 % — SIGNIFICANT CHANGE UP (ref 13–44)
MCHC RBC-ENTMCNC: 30.3 PG — SIGNIFICANT CHANGE UP (ref 27–34)
MCHC RBC-ENTMCNC: 34.1 GM/DL — SIGNIFICANT CHANGE UP (ref 32–36)
MCV RBC AUTO: 88.8 FL — SIGNIFICANT CHANGE UP (ref 80–100)
MONOCYTES # BLD AUTO: 0.67 K/UL — SIGNIFICANT CHANGE UP (ref 0–0.9)
MONOCYTES NFR BLD AUTO: 8.4 % — SIGNIFICANT CHANGE UP (ref 2–14)
NEUTROPHILS # BLD AUTO: 4.34 K/UL — SIGNIFICANT CHANGE UP (ref 1.8–7.4)
NEUTROPHILS NFR BLD AUTO: 54.1 % — SIGNIFICANT CHANGE UP (ref 43–77)
NRBC # BLD: 0 /100 WBCS — SIGNIFICANT CHANGE UP (ref 0–0)
PCO2 BLDV: 51 MMHG — SIGNIFICANT CHANGE UP (ref 42–55)
PH BLDV: 7.36 — SIGNIFICANT CHANGE UP (ref 7.32–7.43)
PLATELET # BLD AUTO: 283 K/UL — SIGNIFICANT CHANGE UP (ref 150–400)
PO2 BLDV: 20 MMHG — LOW (ref 25–45)
POTASSIUM BLDV-SCNC: 4.5 MMOL/L — SIGNIFICANT CHANGE UP (ref 3.5–5.1)
POTASSIUM SERPL-MCNC: 4.4 MMOL/L — SIGNIFICANT CHANGE UP (ref 3.5–5.3)
POTASSIUM SERPL-SCNC: 4.4 MMOL/L — SIGNIFICANT CHANGE UP (ref 3.5–5.3)
PROT SERPL-MCNC: 7.4 G/DL — SIGNIFICANT CHANGE UP (ref 6–8.3)
PROTHROM AB SERPL-ACNC: 12.3 SEC — SIGNIFICANT CHANGE UP (ref 10.5–13.4)
RBC # BLD: 5.25 M/UL — SIGNIFICANT CHANGE UP (ref 4.2–5.8)
RBC # FLD: 13.2 % — SIGNIFICANT CHANGE UP (ref 10.3–14.5)
SAO2 % BLDV: 31.8 % — LOW (ref 67–88)
SARS-COV-2 RNA SPEC QL NAA+PROBE: SIGNIFICANT CHANGE UP
SODIUM SERPL-SCNC: 139 MMOL/L — SIGNIFICANT CHANGE UP (ref 135–145)
TROPONIN T, HIGH SENSITIVITY RESULT: 11 NG/L — SIGNIFICANT CHANGE UP (ref 0–51)
WBC # BLD: 8.01 K/UL — SIGNIFICANT CHANGE UP (ref 3.8–10.5)
WBC # FLD AUTO: 8.01 K/UL — SIGNIFICANT CHANGE UP (ref 3.8–10.5)

## 2023-04-17 PROCEDURE — 99291 CRITICAL CARE FIRST HOUR: CPT

## 2023-04-17 PROCEDURE — 70496 CT ANGIOGRAPHY HEAD: CPT | Mod: 26,MA

## 2023-04-17 PROCEDURE — 70498 CT ANGIOGRAPHY NECK: CPT | Mod: 26,MA

## 2023-04-17 PROCEDURE — 0042T: CPT | Mod: MA

## 2023-04-17 NOTE — ED CDU PROVIDER DISPOSITION NOTE - CONDITION AT DISCHARGE:
Fax received from [address] [place] [address] requesting a refill on pended medication for 90 days.
Improved

## 2023-04-17 NOTE — ED PROVIDER NOTE - OBJECTIVE STATEMENT
62-year-old male history of hypertension, status post thyroidectomy presenting now for headache and blurry vision–made a code stroke per triage protocol.  States today approximately 20 to 30 minutes prior to arrival developed a gradual onset dull headache associated with visual changes "like I was looking through a prism".  Symptoms self resolved prior to arrival.  No associated chest pain, shortness of breath, leg swelling, fevers, neck stiffness, head injury or trauma.

## 2023-04-17 NOTE — ED CDU PROVIDER DISPOSITION NOTE - CLINICAL COURSE
61 yo M with a PMH of HTN, sp thyroidectomy p/w diffuse headache and blurry vision x 20-30 mins PTA. Code stroke called per triage protocol. Headache was of gradual onset dull headache associated with visual changes "like I was looking through a prism".  Symptoms self resolved spontaneously.  Denies nausea, vomiting, fever, chills, chest pain, SOB, dizziness, paresthesias, extremity weakness, facial asymmetry, recent illness.  In the ED, pt borderline HTNsive. Stroke note called. CTH and CTAs w/o acute pathology. Remainder of w/u thus far non actionable. Neuro recs appreciated. Plan for CDU for continued stroke w/u including MRI and ECHO.  In the CDU**** 63 yo M with a PMH of HTN, sp thyroidectomy p/w diffuse headache and blurry vision x 20-30 mins PTA. Code stroke called per triage protocol. Headache was of gradual onset dull headache associated with visual changes "like I was looking through a prism".  Symptoms self resolved spontaneously.  Denies nausea, vomiting, fever, chills, chest pain, SOB, dizziness, paresthesias, extremity weakness, facial asymmetry, recent illness.  In the ED, pt borderline HTNsive. Stroke note called. CTH and CTAs w/o acute pathology. Remainder of w/u thus far non actionable. Neuro recs appreciated. Plan for CDU for continued stroke w/u including MRI and ECHO.  In the CDU**** 63 yo M with a PMH of HTN, sp thyroidectomy p/w diffuse headache and blurry vision x 20-30 mins PTA. Code stroke called per triage protocol. Headache was of gradual onset, described and was associated with visual changes "like I was looking through a prism".  Had never had before, symptoms self resolved spontaneously.  Denies nausea, vomiting, fever, chills, chest pain, SOB, dizziness, paresthesias, extremity weakness, facial asymmetry, recent illness.  In the ED, pt borderline HTNsive. Stroke note called. CTH and CTAs w/o acute pathology. Remainder of w/u thus far non actionable. Neuro recs appreciated. Plan for CDU for continued stroke w/u including MRI and ECHO.  In the CDU**** 61 yo M with a PMH of HTN, sp thyroidectomy p/w diffuse headache and blurry vision x 20-30 mins PTA. Code stroke called per triage protocol. Headache was of gradual onset, described and was associated with visual changes "like I was looking through a prism".  Had never had before, symptoms self resolved spontaneously.  Denies nausea, vomiting, fever, chills, chest pain, SOB, dizziness, paresthesias, extremity weakness, facial asymmetry, recent illness.  In the ED, pt borderline HTNsive. Stroke note called. CTH and CTAs w/o acute pathology. Remainder of w/u thus far non actionable. Neuro recs appreciated. Plan for CDU for continued stroke w/u including MRI and ECHO.  In the CDU**** 61 yo M with a PMH of HTN, sp thyroidectomy p/w diffuse headache and blurry vision x 20-30 mins PTA. Code stroke called per triage protocol. Headache was of gradual onset, described and was associated with visual changes "like I was looking through a prism".  Had never had before, symptoms self resolved spontaneously.  Denies nausea, vomiting, fever, chills, chest pain, SOB, dizziness, paresthesias, extremity weakness, facial asymmetry, recent illness.  In the ED, pt borderline HTNsive. Stroke note called. CTH and CTAs w/o acute pathology. Remainder of w/u thus far non actionable. Neuro recs appreciated. Plan for CDU for continued stroke w/u including MRI and ECHO.  In the CDU patient had telemetry monitoring, ECHO and MRI which were non-actionable. Patient evaluated by neuro stable for d/c. 63 yo M with a PMH of HTN, sp thyroidectomy p/w diffuse headache and blurry vision x 20-30 mins PTA. Code stroke called per triage protocol. Headache was of gradual onset, described and was associated with visual changes "like I was looking through a prism".  Had never had before, symptoms self resolved spontaneously.  Denies nausea, vomiting, fever, chills, chest pain, SOB, dizziness, paresthesias, extremity weakness, facial asymmetry, recent illness.  In the ED, pt borderline HTNsive. Stroke note called. CTH and CTAs w/o acute pathology. Remainder of w/u thus far non actionable. Neuro recs appreciated. Plan for CDU for continued stroke w/u including MRI and ECHO.  In the CDU patient had telemetry monitoring, ECHO and MRI which were non-actionable. Patient evaluated by neuro stable for d/c.

## 2023-04-17 NOTE — CONSULT NOTE ADULT - ATTENDING COMMENTS
code stroke called in ED and neurology emergently assessed patient   Briefly    62y  RH man with  HTN, HLD, thyroid gland removal 6 m ago, presenting s/p b/l visual disturbances lasting for less than 20 minutes.  LKW 10:30 AM 4/17. Patient who works as an , was at his computer and suddenly started seeing a round dot in the periphery of both eyes. When he closed each eye, the visual disturbance went away. Also, saw visual distortions such as zig zag lines. Says beforehand, he had a mild, dull HA in the back of his head and said it was not significant. Yesterday, 4/16, had a mild HA at the base of his neck. Denies migraines and frequent HA. Denies weakness, numbness, dizziness, nausea, vomiting. Not on AC/AP. Takes Crestor for HLD.     NIHSS:0  preMRS: 0  Not a tenecteplase candidate given sx resolved.   Not a thrombectomy candidate given no LVO.     Currently, patient denies HA.  CTH neg  CTA H/N neg  MRI brain neg  TTE neg   LDL 74       Impression: B/l visual disturbances with HA likely migraine with aura. stroke ruled out.  doubt TIA>  need to r/o seizure in office   - can hold of ASA for now given symptomatology c/w migraine  - Hemoglobin A1c   - telemetry  - PT/OT/SS/SLP, OOBC, no needs   - EEG In office   - check FS, glucose control <180  - GI/DVT ppx  - Counseling on diet, exercise, and medication adherence was done  - Counseling on smoking cessation and alcohol consumption offered when appropriate.  - Pain assessed and judicious use of narcotics when appropriate was discussed.    - Stroke education given when appropriate.  - Importance of fall prevention discussed.   - Differential diagnosis and plan of care discussed with patient and/or family and primary team  - Thank you for allowing me to participate in the care of this patient. Call with questions.   - spoke with ED team . tiff for discharge planning with outpatient f/u  Garett Doran MD  Vascular Neurology  Office: [phone] code stroke called in ED and neurology emergently assessed patient   Briefly    62y  RH man with  HTN, HLD, thyroid gland removal 6 m ago, presenting s/p b/l visual disturbances lasting for less than 20 minutes.  LKW 10:30 AM 4/17. Patient who works as an , was at his computer and suddenly started seeing a round dot in the periphery of both eyes. When he closed each eye, the visual disturbance went away. Also, saw visual distortions such as zig zag lines. Says beforehand, he had a mild, dull HA in the back of his head and said it was not significant. Yesterday, 4/16, had a mild HA at the base of his neck. Denies migraines and frequent HA. Denies weakness, numbness, dizziness, nausea, vomiting. Not on AC/AP. Takes Crestor for HLD.     NIHSS:0  preMRS: 0  Not a tenecteplase candidate given sx resolved.   Not a thrombectomy candidate given no LVO.     Currently, patient denies HA.  CTH neg  CTA H/N neg  MRI brain neg  TTE neg   LDL 74       Impression: B/l visual disturbances with HA likely migraine with aura. stroke ruled out.  doubt TIA>  need to r/o seizure in office   - can hold of ASA for now given symptomatology c/w migraine  - Hemoglobin A1c   - telemetry  - PT/OT/SS/SLP, OOBC, no needs   - EEG In office   - check FS, glucose control <180  - GI/DVT ppx  - Counseling on diet, exercise, and medication adherence was done  - Counseling on smoking cessation and alcohol consumption offered when appropriate.  - Pain assessed and judicious use of narcotics when appropriate was discussed.    - Stroke education given when appropriate.  - Importance of fall prevention discussed.   - Differential diagnosis and plan of care discussed with patient and/or family and primary team  - Thank you for allowing me to participate in the care of this patient. Call with questions.   - spoke with ED team . tiff for discharge planning with outpatient f/u  Garett Doran MD  Vascular Neurology  Office: [phone] code stroke called in ED and neurology emergently assessed patient   Briefly    62y  RH man with  HTN, HLD, thyroid gland removal 6 m ago, presenting s/p b/l visual disturbances lasting for less than 20 minutes.  LKW 10:30 AM 4/17. Patient who works as an , was at his computer and suddenly started seeing a round dot in the periphery of both eyes. When he closed each eye, the visual disturbance went away. Also, saw visual distortions such as zig zag lines. Says beforehand, he had a mild, dull HA in the back of his head and said it was not significant. Yesterday, 4/16, had a mild HA at the base of his neck. Denies migraines and frequent HA. Denies weakness, numbness, dizziness, nausea, vomiting. Not on AC/AP. Takes Crestor for HLD.     NIHSS:0  preMRS: 0  Not a tenecteplase candidate given sx resolved.   Not a thrombectomy candidate given no LVO.     Currently, patient denies HA.  CTH neg  CTA H/N neg  MRI brain neg  TTE neg   LDL 74       Impression: B/l visual disturbances with HA likely migraine with aura. stroke ruled out.  doubt TIA>  need to r/o seizure in office   - can hold of ASA for now given symptomatology c/w migraine  - Hemoglobin A1c   - telemetry  - PT/OT/SS/SLP, OOBC, no needs   - EEG In office   - check FS, glucose control <180  - GI/DVT ppx  - Counseling on diet, exercise, and medication adherence was done  - Counseling on smoking cessation and alcohol consumption offered when appropriate.  - Pain assessed and judicious use of narcotics when appropriate was discussed.    - Stroke education given when appropriate.  - Importance of fall prevention discussed.   - Differential diagnosis and plan of care discussed with patient and/or family and primary team  - Thank you for allowing me to participate in the care of this patient. Call with questions.   - spoke with ED team . tiff for discharge planning with outpatient f/u  Garett Doran MD  Vascular Neurology  Office: [phone]

## 2023-04-17 NOTE — ED PROVIDER NOTE - PHYSICAL EXAMINATION
Vitals: I have reviewed the patients vital signs  General: Well dressed, well appearing, no acute distress  HEENT: Atraumatic, normocephalic, airway patent  Neck: no tracheal deviation, no JVD  Chest/Lungs: no trauma, symmetric chest rise, speaking in complete sentences, no WOB  Heart: skin and extremities well perfused, regular rate and rhythm  Neuro: A+Ox3, CN II-XII intact, speech coherent and non dysarthric, non ataxic gait, finger-nose and heel-shin testing normal. Muscle strength at baseline in all extremities  Eyes: PERRL, EOMI, no conjunctival injection   MSK: strength at baseline in all extremities, no muscle wasting or atrophy  Skin: no cyanosis, no jaundice, no new emergent lesions

## 2023-04-17 NOTE — ED ADULT NURSE NOTE - OBJECTIVE STATEMENT
Pt is a 62y M AxO4, PMH HTN, high cholesterol presents to ED as code stroke for bilateral visual disturbances. Upon arrival, pt brought to CT. Pt states at 10:30 this morning while doing paperwork his peripheral vision began to become blurred and he felt a mild dull headache. About 20 minutes later, R eye resolved and then L eye resolved shortly after. Pt currently denying headache, blurred vision, nausea, vomiting, numbness, tingling.  Breathing spontaneous and unlabored, sensation intact, hand  strong bilaterally, face symmetrical. Abd soft and nontender, skin warm dry intact. Pt is well appearing, speaking full sentences, VSS, maintained on cardiac monitor and pulse ox, no acute distress noted.

## 2023-04-17 NOTE — ED ADULT NURSE NOTE - NSIMPLEMENTINTERV_GEN_ALL_ED
Implemented All Universal Safety Interventions:  [place] to call system. Call bell, personal items and telephone within reach. Instruct patient to call for assistance. Room bathroom lighting operational. Non-slip footwear when patient is off stretcher. Physically safe environment: no spills, clutter or unnecessary equipment. Stretcher in lowest position, wheels locked, appropriate side rails in place. Implemented All Universal Safety Interventions:  [place] to call system. Call bell, personal items and telephone within reach. Instruct patient to call for assistance. Room bathroom lighting operational. Non-slip footwear when patient is off stretcher. Physically safe environment: no spills, clutter or unnecessary equipment. Stretcher in lowest position, wheels locked, appropriate side rails in place. Implemented All Universal Safety Interventions:  [place] to call system. Call bell, personal items and telephone within reach. Instruct patient to call for assistance. Room bathroom lighting operational. Non-slip footwear when patient is off stretcher. Physically safe environment: no spills, clutter or unnecessary equipment. Stretcher in lowest position, wheels locked, appropriate side rails in place.

## 2023-04-17 NOTE — ED CDU PROVIDER INITIAL DAY NOTE - NSICDXPASTMEDICALHX_GEN_ALL_CORE_FT
PAST MEDICAL HISTORY:  History of thyroid disease     HLD (hyperlipidemia)     HTN (hypertension)

## 2023-04-17 NOTE — CONSULT NOTE ADULT - ASSESSMENT
ALLISON ACUÑA is a 62y (1960) RH man with a PMHx significant for HTN, HLD, thyroid gland removal 6 m ago, presenting s/p b/l visual disturbances lasting for less than 20 minutes.  LKW 10:30 AM 4/17. Patient who works as an , was at his computer and suddenly started seeing a round dot in the periphery of both eyes. When he closed each eye, the visual disturbance went away. Also, saw visual distortions such as zig zag lines. Says beforehand, he had a mild, dull HA in the back of his head and said it was not significant. Yesterday, 4/16, had a mild HA at the base of his neck. Denies migraines and frequent HA. Denies weakness, numbness, dizziness, nausea, vomiting. Not on AC/AP. Takes Crestor for HLD.     NIHSS:0  preMRS: 0  Not a tenecteplase candidate given sx resolved.   Not a thrombectomy candidate given no LVO.     Currently, patient denies HA.    Impression: B/l visual disturbances with HA likely migraine with aura. Given vascular risk factors, can r/o vascular etiology.     Recommendations:   []CDU for MRI brain wo   []C/w home crestor   [] TTE   []  DVT prophylaxis   []  Telemonitoring;  Neurochecks per unit protocol  []  Permissive HTN up to 220/120 mmHg for first 24 hours after the symptom onset followed by gradual normotension.   []  Please send HbA1C and Lipid Panel  []  PT/OT evaluation  []  NPO until clears Dysphagia screen, otherwise Swallow evaluation  []  Stroke education provided     Case d/w stroke fellow. Case to be seen by neurology attending.

## 2023-04-17 NOTE — ED CDU PROVIDER DISPOSITION NOTE - PATIENT PORTAL LINK FT
You can access the FollowMyHealth Patient Portal offered by [facility] by registering at the following website: [email or web address]. By joining [employer]’s FollowMyHealth portal, you will also be able to view your health information using other applications (apps) compatible with our system. You can access the FollowMyHealth Patient Portal offered by [facility] by registering at the following website: [email or web address]. By joining [employer]’s FollowMyHealth portal, you will also be able to view your health information using other applications (apps) compatible with our system. You can access the FollowMyHealth Patient Portal offered by [facility] by registering at the following website: [email or web address]. By joining [employer]’s FollowMyHealth portal, you will also be able to view your health information using other applications (apps) compatible with our system.

## 2023-04-17 NOTE — ED PROVIDER NOTE - ATTENDING CONTRIBUTION TO CARE
62-year-old female with history of hypertension and recent thyroidectomy presents with 20 to 30-minute episode of visual field defects in both eyes.  Started with the left and then right by the time of arrival patient is asymptomatic.  Code stroke was called patient  physical examination is intact in detail briefly, S1-S2, clear lungs, soft nontender abdomen,  In consultation with the stroke team and decision was made to proceed with advanced imaging lab work and likely admission to CDU for imaging MRI

## 2023-04-17 NOTE — ED CDU PROVIDER DISPOSITION NOTE - NSFOLLOWUPINSTRUCTIONS_ED_ALL_ED_FT
You were seen and evaluated in the ED for a headache and visual changes.   Please make sure to follow up with your primary care doctor within 1-2 days and with the NEUROLOGY specialist. The information for follow up can be found below. Bring a copy of all of your results with you to your follow up appointments.   Return to the ER as discussed if you develop any new or worsening symptoms.       NEUROLOGY You were seen and evaluated in the ED for a headache and visual changes.   Please make sure to follow up with your primary care doctor within 1-2 days and with the NEUROLOGY specialist. The information for follow up can be found below. Bring a copy of all of your results with you to your follow up appointments.   Return to the ER as discussed if you develop any new or worsening symptoms.       NEUROLOGY: You were seen and evaluated in the ED for a headache and visual changes.   Please make sure to follow up with your primary care doctor within 1-2 days and with the NEUROLOGY specialist within 1 week. The information for follow up can be found below. Bring a copy of all of your results with you to your follow up appointments.     Garett Doran MD  Vascular Neurology  Office: [phone] .     Take Ibuprofen (i.e. Motrin, Advil) 600mg every 8 hrs for pain as needed. Take with food.   May alternate with Acetminophen (Tylenol) 650mg every 6 hours for pain as needed.    Rest. Stay hydrated.     Return to the emergency department if you have worsening pain, headaches, difficulty seeing, fainting, numbness, tingling, or any other concerning symptoms. You were seen and evaluated in the ED for a headache and visual changes.   Please make sure to follow up with your primary care doctor within 1-2 days and with the NEUROLOGY specialist within 1 week. The information for follow up can be found below. Bring a copy of all of your results with you to your follow up appointments.     Garett Doran MD  Vascular Neurology  Office: [phone] .     Take Ibuprofen (i.e. Motrin, Advil) 600mg every 8 hrs for pain as needed. Take with food.   May alternate with Acetminophen (Tylenol) 650mg every 6 hours for pain as needed.    Rest. Stay hydrated.     Return to the emergency department if you have worsening pain, headaches, difficulty seeing, fainting, numbness, tingling, or any other concerning symptoms. You were seen and evaluated in the ED for a headache and visual changes.   Please make sure to follow up with your primary care doctor within 1-2 days and with the NEUROLOGY specialist within 1 week. The information for follow up can be found below. Bring a copy of all of your results with you to your follow up appointments.     Garett Doran MD  Vascular Neurology  Office: [phone] .     Take Ibuprofen (i.e. Motrin, Advil) 600mg every 8 hrs for pain as needed. Take with food.   May alternate with Acetminophen (Tylenol) 650mg every 6 hours for pain as needed.    Rest. Stay hydrated.     Return to the emergency department if you have worsening pain, headaches, difficulty seeing, fainting, numbness, tingling, or any other concerning symptoms.

## 2023-04-17 NOTE — ED PROVIDER NOTE - CLINICAL SUMMARY MEDICAL DECISION MAKING FREE TEXT BOX
62-year-old male history of hypertension presenting now for resolved visual changes in the setting of a dull headache.  Described as a prism like effect, bilateral eyes.  Visual fields intact by confrontation, 3 mm reactive, extract movements intact, no conjunctival injection.  Given concurrence of symptoms with headache as well as the description likely migraine with aura however cannot exclude intracerebral pathology we will continue with code stroke pathway as patient is within the window, CT/CTA/CTP, neuro consult, dispo pending results.

## 2023-04-17 NOTE — ED CDU PROVIDER INITIAL DAY NOTE - PROGRESS NOTE DETAILS
CDU PROGRESS NOTE TIFFANIE CRABTREE: Received pt at 1900 sign-out. Case/plan reviewed. VSS. Pt is aox3, speaking coherently, no focal neuro deficits. Pt ambulatory around unit with steady gait. S1 S2 noted, RRR, lungs CTA b/l, BS x4 with soft, nontender abdomen. Pt without complaints, no headache. Will continue to monitor overnight.

## 2023-04-17 NOTE — ED CDU PROVIDER INITIAL DAY NOTE - CLINICAL SUMMARY MEDICAL DECISION MAKING FREE TEXT BOX
62yr M hx of htn w visual field defects now resolved. neuro stroke team admitted for MRI and further management.

## 2023-04-17 NOTE — ED CDU PROVIDER INITIAL DAY NOTE - OBJECTIVE STATEMENT
61 yo M with a PMH of HTN, sp thyroidectomy p/w diffuse headache and blurry vision x 20-30 mins PTA. Code stroke called per triage protocol. Headache was of gradual onset dull headache associated with visual changes "like I was looking through a prism".  Symptoms self resolved spontaneously.  Denies nausea, vomiting, fever, chills, chest pain, SOB, dizziness, paresthesias, extremity weakness, facial asymmetry, recent illness. 63 yo M with a PMH of HTN, sp thyroidectomy p/w diffuse headache and blurry vision x 20-30 mins PTA. Code stroke called per triage protocol. Headache was of gradual onset dull headache associated with visual changes "like I was looking through a prism".  Symptoms self resolved spontaneously.  Denies nausea, vomiting, fever, chills, chest pain, SOB, dizziness, paresthesias, extremity weakness, facial asymmetry, recent illness. 61 yo M with a PMH of HTN, sp thyroidectomy p/w diffuse headache and blurry vision x 20-30 mins PTA. Code stroke called per triage protocol. Headache was of gradual onset, described and was associated with visual changes "like I was looking through a prism".  Had never had before, symptoms self resolved spontaneously.  Denies nausea, vomiting, fever, chills, chest pain, SOB, dizziness, paresthesias, extremity weakness, facial asymmetry, recent illness.

## 2023-04-17 NOTE — ED PROCEDURE NOTE - ATTENDING CONTRIBUTION TO CARE
I, Matilde Mckee, performed a history and physical exam of the patient and discussed their management with the resident, student, and/or fellow. I reviewed the note and agree with the documented findings and plan of care. I was present and available for all procedures.

## 2023-04-17 NOTE — ED ADULT NURSE NOTE - CINV DISCH MEDS REVIEWED YN
Rx Refill Note  Requested Prescriptions     Pending Prescriptions Disp Refills   • memantine (Namenda) 10 MG tablet 180 tablet      Sig: Take 1 tablet by mouth 2 (Two) Times a Day.   • donepezil (Aricept) 10 MG tablet 90 tablet      Sig: Take 1 tablet by mouth Daily.      Last office visit with prescribing clinician: 01/30/2023  Next office visit with prescribing clinician: 5/1/2023   Last Labs Done: 11/01/2022 (Information found in [facility])    Gal Bell CMA  03/15/23, 16:22 EDT  
Yes

## 2023-04-17 NOTE — ED CDU PROVIDER DISPOSITION NOTE - ATTENDING APP SHARED VISIT CONTRIBUTION OF CARE
Adult male pw c/o migraine w aura first episode. Now symptom free. CT/MRI/TTE all normal pt stable for opt follow up  Jagdeep Ardon MD, Facep

## 2023-04-18 VITALS
SYSTOLIC BLOOD PRESSURE: 145 MMHG | OXYGEN SATURATION: 99 % | HEART RATE: 65 BPM | RESPIRATION RATE: 16 BRPM | TEMPERATURE: 98 F | DIASTOLIC BLOOD PRESSURE: 88 MMHG

## 2023-04-18 LAB
A1C WITH ESTIMATED AVERAGE GLUCOSE RESULT: 5.5 % — SIGNIFICANT CHANGE UP (ref 4–5.6)
CHOLEST SERPL-MCNC: 147 MG/DL — SIGNIFICANT CHANGE UP
ESTIMATED AVERAGE GLUCOSE: 111 MG/DL — SIGNIFICANT CHANGE UP (ref 68–114)
HDLC SERPL-MCNC: 47 MG/DL — SIGNIFICANT CHANGE UP
LIPID PNL WITH DIRECT LDL SERPL: 74 MG/DL — SIGNIFICANT CHANGE UP
NON HDL CHOLESTEROL: 100 MG/DL — SIGNIFICANT CHANGE UP
TRIGL SERPL-MCNC: 126 MG/DL — SIGNIFICANT CHANGE UP

## 2023-04-18 PROCEDURE — 85730 THROMBOPLASTIN TIME PARTIAL: CPT

## 2023-04-18 PROCEDURE — 82803 BLOOD GASES ANY COMBINATION: CPT

## 2023-04-18 PROCEDURE — 70450 CT HEAD/BRAIN W/O DYE: CPT | Mod: MA

## 2023-04-18 PROCEDURE — 85610 PROTHROMBIN TIME: CPT

## 2023-04-18 PROCEDURE — 82962 GLUCOSE BLOOD TEST: CPT

## 2023-04-18 PROCEDURE — 83036 HEMOGLOBIN GLYCOSYLATED A1C: CPT

## 2023-04-18 PROCEDURE — 82330 ASSAY OF CALCIUM: CPT

## 2023-04-18 PROCEDURE — 84295 ASSAY OF SERUM SODIUM: CPT

## 2023-04-18 PROCEDURE — 84484 ASSAY OF TROPONIN QUANT: CPT

## 2023-04-18 PROCEDURE — 80053 COMPREHEN METABOLIC PANEL: CPT

## 2023-04-18 PROCEDURE — C8929: CPT

## 2023-04-18 PROCEDURE — 85014 HEMATOCRIT: CPT

## 2023-04-18 PROCEDURE — 82947 ASSAY GLUCOSE BLOOD QUANT: CPT

## 2023-04-18 PROCEDURE — 36000 PLACE NEEDLE IN VEIN: CPT

## 2023-04-18 PROCEDURE — 85025 COMPLETE CBC W/AUTO DIFF WBC: CPT

## 2023-04-18 PROCEDURE — 70551 MRI BRAIN STEM W/O DYE: CPT | Mod: 26,MA

## 2023-04-18 PROCEDURE — 82435 ASSAY OF BLOOD CHLORIDE: CPT

## 2023-04-18 PROCEDURE — 70496 CT ANGIOGRAPHY HEAD: CPT | Mod: MA

## 2023-04-18 PROCEDURE — 80061 LIPID PANEL: CPT

## 2023-04-18 PROCEDURE — 0042T: CPT | Mod: MA

## 2023-04-18 PROCEDURE — 83605 ASSAY OF LACTIC ACID: CPT

## 2023-04-18 PROCEDURE — 87635 SARS-COV-2 COVID-19 AMP PRB: CPT

## 2023-04-18 PROCEDURE — 70498 CT ANGIOGRAPHY NECK: CPT | Mod: MA

## 2023-04-18 PROCEDURE — 99238 HOSP IP/OBS DSCHRG MGMT 30/<: CPT

## 2023-04-18 PROCEDURE — 99291 CRITICAL CARE FIRST HOUR: CPT | Mod: 25

## 2023-04-18 PROCEDURE — 93306 TTE W/DOPPLER COMPLETE: CPT | Mod: 26

## 2023-04-18 PROCEDURE — 93005 ELECTROCARDIOGRAM TRACING: CPT

## 2023-04-18 PROCEDURE — 70551 MRI BRAIN STEM W/O DYE: CPT | Mod: MA

## 2023-04-18 PROCEDURE — 85018 HEMOGLOBIN: CPT

## 2023-04-18 PROCEDURE — 84132 ASSAY OF SERUM POTASSIUM: CPT

## 2023-04-18 PROCEDURE — G0378: CPT

## 2023-04-18 NOTE — ED CDU PROVIDER SUBSEQUENT DAY NOTE - HISTORY
CDU PROGRESS NOTE TIFFANIE CRABTREE: Pt resting comfortably, NAD, VSS. No events on telemetry. Pt is aox3, speaking coherently, no focal neuro deficits. Pt without complaints, will continue to monitor, f/u MRI and Neuro recs. CDU PROGRESS NOTE TIFFANIE CRABRTEE: Pt resting comfortably, NAD, VSS. No events on telemetry. Pt is aox3, speaking coherently, no focal neuro deficits. Pt without complaints, will continue to monitor, f/u MRI and Neuro recs.

## 2023-04-18 NOTE — ED ADULT NURSE REASSESSMENT NOTE - NSFALLRSKASSESSDT_ED_ALL_ED
17-Apr-2023 13:04
17-Apr-2023 16:17
18-Apr-2023 09:11
17-Apr-2023 22:01
18-Apr-2023 04:25
18-Apr-2023 00:48

## 2023-04-18 NOTE — ED CDU PROVIDER SUBSEQUENT DAY NOTE - ATTENDING APP SHARED VISIT CONTRIBUTION OF CARE
Adult male pmh Hypertension,thyrodectomy c/o ha w blurry vison yesterday (aura w wavy lines) Now resolved, No weakness numbness, PE WDWN male awake alert normocephalic atraumatic neck supple chest clear anterior & posterior cv no rubs, gallops or murmurs abd soft +Bs no mass guarding neuro no focal defects.   Jagdeep Ardon MD, Facep

## 2023-04-18 NOTE — ED ADULT NURSE REASSESSMENT NOTE - NEURO BEHAVIOR
calm
Dapsone Pregnancy And Lactation Text: This medication is Pregnancy Category C and is not considered safe during pregnancy or breast feeding.

## 2023-04-18 NOTE — ED CDU PROVIDER SUBSEQUENT DAY NOTE - PROGRESS NOTE DETAILS
Patient evaluated at bedside. NAD. VSS. No complaints at this time. MRI and ECHO resulted WNL. Stroke neurologist evaluated at bedside and stated pt is cleared for d/c home. Cassi Rodriguez PA-C Patient evaluated at bedside. NAD. VSS. No complaints at this time. MRI and ECHO resulted WNL. Stroke neurologist evaluated at bedside and stated pt is cleared for d/c home. CDU attending evaluated at bedside. Stable for d/c. Answered all questions/concerns. Cassi Rodriguez PA-C

## 2023-04-18 NOTE — ED CDU PROVIDER SUBSEQUENT DAY NOTE - CLINICAL SUMMARY MEDICAL DECISION MAKING FREE TEXT BOX
Adult male pw ha/aura cw migraine, CT head/mri/tte normal pt cleared by Melecio attending for opt follow up  Jagdeep Ardon MD, Facep

## 2023-04-18 NOTE — ED ADULT NURSE REASSESSMENT NOTE - NS ED NURSE REASSESS COMMENT FT1
15.15 Received the Pt from  RN Rox Davalos  . Pt is Observed for Vision changes  & headache lasted for 20 mints for MRI & ECHO  . Received the Pt A&OX 4 obeys commands Khadra N/V/D fever chills cp SOB   Comfort care & safety measures continued  IV site looks clean & dry no signs of infiltration noted pt denies  pain IV site .  Pt is advised to call for help  call bell with in the reach pt verbalized the understanding .  pending CDU  MD [place] . Pt states now he feels fine no vision changes now  denies headache GCS 15/15 A&OX 4 PERRLA  size 3 Strong upper & lower extremities steady gait   No facial droop  No Hand Leg drop denies numbness tingling Continue to monitor
Pt updated on POC, awaiting CDU PA to assess pt. VSS, no acute distress.
Pt received from LUIS ANTONIO Russell. Pt oriented to CDU & plan of care was discussed. Pt A&O x 4. Pt in CDU for MRI results, Echo results. Pt denies any headache, dizziness, lightheadedness, weakness, numbness, visual or speech disturbances. On neuro exam, A&O x 4, PERRLA, EOMI,  strength equal, sensation intact, clear speech. Pt on a cardiac monitor in NSR, HR in 80's. V/S stable, pt afebrile,  IV in place, patent and free of signs of infiltration. Pt resting in bed. Safety & comfort measures maintained. Call bell in reach. Will continue to monitor.
Received report from LUIS ANTONIO Wilde. Pt is A&Ox3, breathing spontaneously, unlabored and speaking in full sentences on RA. Pend MRI. On CM, NSR. Pt safety and comfort measures provided.

## 2023-12-29 PROBLEM — E05.90 THYROTOXICOSIS, UNSPECIFIED WITHOUT THYROTOXIC CRISIS OR STORM: Chronic | Status: ACTIVE | Noted: 2021-12-23

## 2023-12-29 PROBLEM — N40.0 BENIGN PROSTATIC HYPERPLASIA WITHOUT LOWER URINARY TRACT SYMPTOMS: Chronic | Status: ACTIVE | Noted: 2021-12-23

## 2023-12-29 PROBLEM — E78.5 HYPERLIPIDEMIA, UNSPECIFIED: Chronic | Status: ACTIVE | Noted: 2021-12-23

## 2023-12-29 PROBLEM — M10.9 GOUT, UNSPECIFIED: Chronic | Status: ACTIVE | Noted: 2021-12-23

## 2023-12-29 PROBLEM — I10 ESSENTIAL (PRIMARY) HYPERTENSION: Chronic | Status: ACTIVE | Noted: 2021-12-23

## 2024-02-05 NOTE — CONSULT NOTE ADULT - SUBJECTIVE AND OBJECTIVE BOX
There is nothing sooner, please add to wait list and keep patient updated if there is an opening.   Neurology - Consult Note    -  Spectra: [phone] ([facility]), [phone] ([facility])  -    HPI: Patient ALLISON ACUÑA is a 62y (1960) RH man with a PMHx significant for HTN, HLD, thyroid gland removal 6 m ago, presenting s/p b/l visual disturbances lasting for less than 20 minutes.       Review of Systems:  INCOMPLETE   CONSTITUTIONAL: No fevers or chills  EYES AND ENT: No visual changes or no throat pain   NECK: No pain or stiffness  RESPIRATORY: No hemoptysis or shortness of breath  CARDIOVASCULAR: No chest pain or palpitations  GASTROINTESTINAL: No melena or hematochezia  GENITOURINARY: No dysuria or hematuria  NEUROLOGICAL: +As stated in HPI above  SKIN: No itching, burning, rashes, or lesions   All other review of systems is negative unless indicated above.    Allergies:  No Known Allergies      PMHx/PSHx/Family Hx: As above, otherwise see below       Social Hx:  No current use of tobacco, alcohol, or illicit drugs  Lives with ***    Medications:  MEDICATIONS  (STANDING):    MEDICATIONS  (PRN):      Vitals:  T(C): 36.6 (04-17-23 @ 11:13), Max: 36.6 (04-17-23 @ 11:13)  HR: 74 (04-17-23 @ 11:13) (74 - 74)  BP: 173/116 (04-17-23 @ 11:13) (173/116 - 173/116)  RR: 18 (04-17-23 @ 11:13) (18 - 18)  SpO2: 95% (04-17-23 @ 11:13) (95% - 95%)    Physical Examination: INCOMPLETE  General - NAD  Cardiovascular - Peripheral pulses palpable, no edema  Eyes - Fundoscopy with flat, sharp optic discs and no hemorrhage or exudates; Fundoscopy not well visualized; Fundoscopy not performed due to safety precautions in the setting of the COVID-19 pandemic    Neurologic Exam:  Mental status - Awake, Alert, Oriented to person, place, and time. Speech fluent, repetition and naming intact. Follows simple and complex commands. Attention/concentration, recent and remote memory (including registration and recall), and fund of knowledge intact    Cranial nerves - PERRLA, VFF, EOMI, face sensation (V1-V3) intact b/l, facial strength intact without asymmetry b/l, hearing intact b/l, palate with symmetric elevation, trapezius OR sternocleidomastiod 5/5 strength b/l, tongue midline on protrusion with full lateral movement    Motor - Normal bulk and tone throughout. No pronator drift.  Strength testing            Deltoid      Biceps      Triceps     Wrist Extension    Wrist Flexion     Interossei         R            5                 5               5                     5                              5                        5                 5  L             5                 5               5                     5                              5                        5                 5              Hip Flexion    Hip Extension    Knee Flexion    Knee Extension    Dorsiflexion    Plantar Flexion  R              5                           5                       5                           5                            5                          5  L              5                           5                        5                           5                            5                          5    Sensation - Light touch/temperature OR pain/vibration intact throughout    DTR's -             Biceps      Triceps     Brachioradialis      Patellar    Ankle    Toes/plantar response  R             2+             2+                  2+                       2+            2+                 Down  L              2+             2+                 2+                        2+           2+                 Down    Coordination - Finger to Nose intact b/l. No tremors appreciated    Gait and station - Normal casual gait. Romberg (-)    Labs:          CAPILLARY BLOOD GLUCOSE      POCT Blood Glucose.: 100 mg/dL (17 Apr 2023 11:16)          CSF:                  Radiology:     Neurology - Consult Note    -  Spectra: [phone] ([facility]), [phone] ([facility])  -    HPI: Patient ALLISON ACUÑA is a 62y (1960) RH man with a PMHx significant for HTN, HLD, thyroid gland removal 6 m ago, presenting s/p b/l visual disturbances lasting for less than 20 minutes.       Review of Systems:  INCOMPLETE   CONSTITUTIONAL: No fevers or chills  EYES AND ENT: No visual changes or no throat pain   NECK: No pain or stiffness  RESPIRATORY: No hemoptysis or shortness of breath  CARDIOVASCULAR: No chest pain or palpitations  GASTROINTESTINAL: No melena or hematochezia  GENITOURINARY: No dysuria or hematuria  NEUROLOGICAL: +As stated in HPI above  SKIN: No itching, burning, rashes, or lesions   All other review of systems is negative unless indicated above.    Allergies:  No Known Allergies      PMHx/PSHx/Family Hx: As above, otherwise see below       Social Hx:  No current use of tobacco, alcohol, or illicit drugs  Lives with ***    Medications:  MEDICATIONS  (STANDING):    MEDICATIONS  (PRN):      Vitals:  T(C): 36.6 (04-17-23 @ 11:13), Max: 36.6 (04-17-23 @ 11:13)  HR: 74 (04-17-23 @ 11:13) (74 - 74)  BP: 173/116 (04-17-23 @ 11:13) (173/116 - 173/116)  RR: 18 (04-17-23 @ 11:13) (18 - 18)  SpO2: 95% (04-17-23 @ 11:13) (95% - 95%)    Physical Examination: INCOMPLETE  General - NAD  Cardiovascular - Peripheral pulses palpable, no edema  Eyes - Fundoscopy with flat, sharp optic discs and no hemorrhage or exudates; Fundoscopy not well visualized; Fundoscopy not performed due to safety precautions in the setting of the COVID-19 pandemic    Neurologic Exam:  Mental status - Awake, Alert, Oriented to person, place, and time. Speech fluent, repetition and naming intact. Follows simple and complex commands. Attention/concentration, recent and remote memory (including registration and recall), and fund of knowledge intact    Cranial nerves - PERRLA, VFF, EOMI, face sensation (V1-V3) intact b/l, facial strength intact without asymmetry b/l, hearing intact b/l, palate with symmetric elevation, trapezius OR sternocleidomastiod 5/5 strength b/l, tongue midline on protrusion with full lateral movement    Motor - Normal bulk and tone throughout. No pronator drift.  Strength testing            Deltoid      Biceps      Triceps     Wrist Extension    Wrist Flexion     Interossei         R            5                 5               5                     5                              5                        5                 5  L             5                 5               5                     5                              5                        5                 5              Hip Flexion    Hip Extension    Knee Flexion    Knee Extension    Dorsiflexion    Plantar Flexion  R              5                           5                       5                           5                            5                          5  L              5                           5                        5                           5                            5                          5    Sensation - Light touch/temperature OR pain/vibration intact throughout    DTR's -             Biceps      Triceps     Brachioradialis      Patellar    Ankle    Toes/plantar response  R             2+             2+                  2+                       2+            2+                 Down  L              2+             2+                 2+                        2+           2+                 Down    Coordination - Finger to Nose intact b/l. No tremors appreciated    Gait and station - Normal casual gait. Romberg (-)    Labs:          CAPILLARY BLOOD GLUCOSE      POCT Blood Glucose.: 100 mg/dL (17 Apr 2023 11:16)          CSF:                  Radiology:     Neurology - Consult Note    -  Spectra: [phone] ([facility]), [phone] ([facility])  -    HPI: Patient ALLISON ACUÑA is a 62y (1960) RH man with a PMHx significant for HTN, HLD, thyroid gland removal 6 m ago, presenting s/p b/l visual disturbances lasting for less than 20 minutes.       Review of Systems:  INCOMPLETE   CONSTITUTIONAL: No fevers or chills  EYES AND ENT: No visual changes or no throat pain   NECK: No pain or stiffness  RESPIRATORY: No hemoptysis or shortness of breath  CARDIOVASCULAR: No chest pain or palpitations  GASTROINTESTINAL: No melena or hematochezia  GENITOURINARY: No dysuria or hematuria  NEUROLOGICAL: +As stated in HPI above  SKIN: No itching, burning, rashes, or lesions   All other review of systems is negative unless indicated above.    Allergies:  No Known Allergies      PMHx/PSHx/Family Hx: As above, otherwise see below       Social Hx:  No current use of tobacco, alcohol, or illicit drugs  Lives with ***    Medications:  MEDICATIONS  (STANDING):    MEDICATIONS  (PRN):      Vitals:  T(C): 36.6 (04-17-23 @ 11:13), Max: 36.6 (04-17-23 @ 11:13)  HR: 74 (04-17-23 @ 11:13) (74 - 74)  BP: 173/116 (04-17-23 @ 11:13) (173/116 - 173/116)  RR: 18 (04-17-23 @ 11:13) (18 - 18)  SpO2: 95% (04-17-23 @ 11:13) (95% - 95%)    Physical Examination: INCOMPLETE  General - NAD  Cardiovascular - Peripheral pulses palpable, no edema  Eyes - Fundoscopy with flat, sharp optic discs and no hemorrhage or exudates; Fundoscopy not well visualized; Fundoscopy not performed due to safety precautions in the setting of the COVID-19 pandemic    Neurologic Exam:  Mental status - Awake, Alert, Oriented to person, place, and time. Speech fluent, repetition and naming intact. Follows simple and complex commands. Attention/concentration, recent and remote memory (including registration and recall), and fund of knowledge intact    Cranial nerves - PERRLA, VFF, EOMI, face sensation (V1-V3) intact b/l, facial strength intact without asymmetry b/l, hearing intact b/l, palate with symmetric elevation, trapezius OR sternocleidomastiod 5/5 strength b/l, tongue midline on protrusion with full lateral movement    Motor - Normal bulk and tone throughout. No pronator drift.  Strength testing            Deltoid      Biceps      Triceps     Wrist Extension    Wrist Flexion     Interossei         R            5                 5               5                     5                              5                        5                 5  L             5                 5               5                     5                              5                        5                 5              Hip Flexion    Hip Extension    Knee Flexion    Knee Extension    Dorsiflexion    Plantar Flexion  R              5                           5                       5                           5                            5                          5  L              5                           5                        5                           5                            5                          5    Sensation - Light touch/temperature OR pain/vibration intact throughout    DTR's -             Biceps      Triceps     Brachioradialis      Patellar    Ankle    Toes/plantar response  R             2+             2+                  2+                       2+            2+                 Down  L              2+             2+                 2+                        2+           2+                 Down    Coordination - Finger to Nose intact b/l. No tremors appreciated    Gait and station - Normal casual gait. Romberg (-)    Labs:          CAPILLARY BLOOD GLUCOSE      POCT Blood Glucose.: 100 mg/dL (17 Apr 2023 11:16)          CSF:                  Radiology:     Neurology - Consult Note    -  Spectra: [phone] ([facility]), [phone] ([facility])  -    HPI: Patient ALLISON ACUÑA is a 62y (1960) RH man with a PMHx significant for HTN, HLD, thyroid gland removal 6 m ago, presenting s/p b/l visual disturbances lasting for less than 20 minutes.  LKW 10:30 AM 4/17. Patient who works as an , was at his computer and suddenly started seeing a round dot in the periphery of both eyes. When he closed each eye, the visual disturbance went away. Also, saw visual distortions such as zig zag lines. Says beforehand, he had a mild, dull HA in the back of his head and said it was not significant. Yesterday, 4/16, had a mild HA at the base of his neck. Denies migraines and frequent HA. Denies weakness, numbness, dizziness, nausea, vomiting. Not on AC/AP. Takes Crestor for HLD.     NIHSS:0  preMRS: 0  Not a tenecteplase candidate given sx resolved.   Not a thrombectomy candidate given no LVO.       Review of Systems:   All other review of systems is negative unless indicated above.    Allergies:  No Known Allergies      PMHx/PSHx/Family Hx: As above, otherwise see below       Social Hx:  No current use of tobacco, alcohol, or illicit drugs      Medications:  MEDICATIONS  (STANDING):    MEDICATIONS  (PRN):      Vitals:  T(C): 36.6 (04-17-23 @ 11:13), Max: 36.6 (04-17-23 @ 11:13)  HR: 74 (04-17-23 @ 11:13) (74 - 74)  BP: 173/116 (04-17-23 @ 11:13) (173/116 - 173/116)  RR: 18 (04-17-23 @ 11:13) (18 - 18)  SpO2: 95% (04-17-23 @ 11:13) (95% - 95%)    Physical Examination:   General - NAD  Cardiovascular - Peripheral pulses palpable  Eyes - Fundoscopy not performed due to safety precautions in the setting of the COVID-19 pandemic    Neurologic Exam:  Mental status - Awake, Alert, Oriented to person, place, and time. Speech fluent, repetition and naming intact. Follows simple and complex commands. Attention/concentration, recent and remote memory (including registration and recall), and fund of knowledge intact    Cranial nerves - PERRLA, VFF, EOMI, face sensation (V1-V3) intact b/l, facial strength intact without asymmetry b/l, hearing intact b/l, palate with symmetric elevation, trapezius 5/5 strength b/l, tongue midline on protrusion with full lateral movement    Motor - Normal bulk and tone throughout. No pronator drift.  Strength testing            Deltoid      Biceps      Triceps     Wrist Extension    Wrist Flexion     Interossei         R            5                 5               5                     5                              5                        5                 5  L             5                 5               5                     5                              5                        5                 5              Hip Flexion    Hip Extension    Knee Flexion    Knee Extension    Dorsiflexion    Plantar Flexion  R              5                           5                       5                           5                            5                          5  L              5                           5                        5                           5                            5                          5    Sensation - Light touch/temperature intact throughout    Coordination - Finger to Nose intact b/l. No tremors appreciated    Gait and station - Normal casual gait.     Labs:          CAPILLARY BLOOD GLUCOSE      POCT Blood Glucose.: 100 mg/dL (17 Apr 2023 11:16)          CSF:                  Radiology:      Brain CT: No acute hemorrhage, mass effect or extra-axial collections.    Neck CTA: No hemodynamically significant stenosis.    Brain CTA: No large vessel occlusion or high-grade stenosis.    Perfusion maps: No acute core infarct or penumbra. Hypoperfusion of 99 mL   involving the posterior fossa and occipital lobes, right greater than   left.   Neurology - Consult Note    -  Spectra: [phone] ([facility]), [phone] ([facility])  -    HPI: Patient ALLISON ACUÑA is a 62y (1960) RH man with a PMHx significant for HTN, HLD, thyroid gland removal 6 m ago, presenting s/p b/l visual disturbances lasting for less than 20 minutes.  LKW 10:30 AM 4/17. Patient who works as an , was at his computer and suddenly started seeing a round dot in the periphery of both eyes. When he closed each eye, the visual disturbance went away. Also, saw visual distortions such as zig zag lines. Says beforehand, he had a mild, dull HA in the back of his head and said it was not significant. Yesterday, 4/16, had a mild HA at the base of his neck. Denies migraines and frequent HA. Denies weakness, numbness, dizziness, nausea, vomiting. Not on AC/AP. Takes Crestor for HLD.     NIHSS:0  preMRS: 0  Not a tenecteplase candidate given sx resolved.   Not a thrombectomy candidate given no LVO.       Review of Systems:   All other review of systems is negative unless indicated above.    Allergies:  No Known Allergies      PMHx/PSHx/Family Hx: As above, otherwise see below       Social Hx:  No current use of tobacco, alcohol, or illicit drugs      Medications:  MEDICATIONS  (STANDING):    MEDICATIONS  (PRN):      Vitals:  T(C): 36.6 (04-17-23 @ 11:13), Max: 36.6 (04-17-23 @ 11:13)  HR: 74 (04-17-23 @ 11:13) (74 - 74)  BP: 173/116 (04-17-23 @ 11:13) (173/116 - 173/116)  RR: 18 (04-17-23 @ 11:13) (18 - 18)  SpO2: 95% (04-17-23 @ 11:13) (95% - 95%)    Physical Examination:   General - NAD  Cardiovascular - Peripheral pulses palpable  Eyes - Fundoscopy not performed due to safety precautions in the setting of the COVID-19 pandemic    Neurologic Exam:  Mental status - Awake, Alert, Oriented to person, place, and time. Speech fluent, repetition and naming intact. Follows simple and complex commands. Attention/concentration, recent and remote memory (including registration and recall), and fund of knowledge intact    Cranial nerves - PERRLA, VFF, EOMI, face sensation (V1-V3) intact b/l, facial strength intact without asymmetry b/l, hearing intact b/l, palate with symmetric elevation, trapezius 5/5 strength b/l, tongue midline on protrusion with full lateral movement    Motor - Normal bulk and tone throughout. No pronator drift.  Strength testing            Deltoid      Biceps      Triceps     Wrist Extension    Wrist Flexion     Interossei         R            5                 5               5                     5                              5                        5                 5  L             5                 5               5                     5                              5                        5                 5              Hip Flexion    Hip Extension    Knee Flexion    Knee Extension    Dorsiflexion    Plantar Flexion  R              5                           5                       5                           5                            5                          5  L              5                           5                        5                           5                            5                          5    Sensation - Light touch/temperature intact throughout    Coordination - Finger to Nose intact b/l. No tremors appreciated    Gait and station - Normal casual gait.     Labs:          CAPILLARY BLOOD GLUCOSE      POCT Blood Glucose.: 100 mg/dL (17 Apr 2023 11:16)          CSF:                  Radiology:      Brain CT: No acute hemorrhage, mass effect or extra-axial collections.    Neck CTA: No hemodynamically significant stenosis.    Brain CTA: No large vessel occlusion or high-grade stenosis.    Perfusion maps: No acute core infarct or penumbra. Hypoperfusion of 99 mL   involving the posterior fossa and occipital lobes, right greater than   left.   Neurology - Consult Note    -  Spectra: [phone] ([facility]), [phone] ([facility])  -    HPI: Patient ALLISON ACUÑA is a 62y (1960) RH man with a PMHx significant for HTN, HLD, thyroid gland removal 6 m ago, presenting s/p b/l visual disturbances lasting for less than 20 minutes.  LKW 10:30 AM 4/17. Patient who works as an , was at his computer and suddenly started seeing a round dot in the periphery of both eyes. When he closed each eye, the visual disturbance went away. Also, saw visual distortions such as zig zag lines. Says beforehand, he had a mild, dull HA in the back of his head and said it was not significant. Yesterday, 4/16, had a mild HA at the base of his neck. Denies migraines and frequent HA. Denies weakness, numbness, dizziness, nausea, vomiting. Not on AC/AP. Takes Crestor for HLD.     NIHSS:0  preMRS: 0  Not a tenecteplase candidate given sx resolved.   Not a thrombectomy candidate given no LVO.       Review of Systems:   All other review of systems is negative unless indicated above.    Allergies:  No Known Allergies      PMHx/PSHx/Family Hx: As above, otherwise see below       Social Hx:  No current use of tobacco, alcohol, or illicit drugs      Medications:  MEDICATIONS  (STANDING):    MEDICATIONS  (PRN):      Vitals:  T(C): 36.6 (04-17-23 @ 11:13), Max: 36.6 (04-17-23 @ 11:13)  HR: 74 (04-17-23 @ 11:13) (74 - 74)  BP: 173/116 (04-17-23 @ 11:13) (173/116 - 173/116)  RR: 18 (04-17-23 @ 11:13) (18 - 18)  SpO2: 95% (04-17-23 @ 11:13) (95% - 95%)    Physical Examination:   General - NAD  Cardiovascular - Peripheral pulses palpable  Eyes - Fundoscopy not performed due to safety precautions in the setting of the COVID-19 pandemic    Neurologic Exam:  Mental status - Awake, Alert, Oriented to person, place, and time. Speech fluent, repetition and naming intact. Follows simple and complex commands. Attention/concentration, recent and remote memory (including registration and recall), and fund of knowledge intact    Cranial nerves - PERRLA, VFF, EOMI, face sensation (V1-V3) intact b/l, facial strength intact without asymmetry b/l, hearing intact b/l, palate with symmetric elevation, trapezius 5/5 strength b/l, tongue midline on protrusion with full lateral movement    Motor - Normal bulk and tone throughout. No pronator drift.  Strength testing            Deltoid      Biceps      Triceps     Wrist Extension    Wrist Flexion     Interossei         R            5                 5               5                     5                              5                        5                 5  L             5                 5               5                     5                              5                        5                 5              Hip Flexion    Hip Extension    Knee Flexion    Knee Extension    Dorsiflexion    Plantar Flexion  R              5                           5                       5                           5                            5                          5  L              5                           5                        5                           5                            5                          5    Sensation - Light touch/temperature intact throughout    Coordination - Finger to Nose intact b/l. No tremors appreciated    Gait and station - Normal casual gait.     Labs:          CAPILLARY BLOOD GLUCOSE      POCT Blood Glucose.: 100 mg/dL (17 Apr 2023 11:16)          CSF:                  Radiology:      Brain CT: No acute hemorrhage, mass effect or extra-axial collections.    Neck CTA: No hemodynamically significant stenosis.    Brain CTA: No large vessel occlusion or high-grade stenosis.    Perfusion maps: No acute core infarct or penumbra. Hypoperfusion of 99 mL   involving the posterior fossa and occipital lobes, right greater than   left.

## 2024-07-08 ENCOUNTER — EMERGENCY (EMERGENCY)
Facility: HOSPITAL | Age: 64
LOS: 1 days | Discharge: HOME | End: 2024-07-08
Attending: STUDENT IN AN ORGANIZED HEALTH CARE EDUCATION/TRAINING PROGRAM
Payer: COMMERCIAL

## 2024-07-08 VITALS
HEART RATE: 55 BPM | DIASTOLIC BLOOD PRESSURE: 87 MMHG | SYSTOLIC BLOOD PRESSURE: 147 MMHG | RESPIRATION RATE: 18 BRPM | TEMPERATURE: 98 F | OXYGEN SATURATION: 97 %

## 2024-07-08 VITALS
SYSTOLIC BLOOD PRESSURE: 173 MMHG | HEART RATE: 67 BPM | TEMPERATURE: 98 F | RESPIRATION RATE: 20 BRPM | OXYGEN SATURATION: 98 % | DIASTOLIC BLOOD PRESSURE: 91 MMHG | HEIGHT: 72 IN | WEIGHT: 259.93 LBS

## 2024-07-08 PROBLEM — E78.5 HYPERLIPIDEMIA, UNSPECIFIED: Chronic | Status: ACTIVE | Noted: 2023-04-17

## 2024-07-08 PROBLEM — I10 ESSENTIAL (PRIMARY) HYPERTENSION: Chronic | Status: ACTIVE | Noted: 2023-04-17

## 2024-07-08 PROBLEM — Z86.39 PERSONAL HISTORY OF OTHER ENDOCRINE, NUTRITIONAL AND METABOLIC DISEASE: Chronic | Status: ACTIVE | Noted: 2023-04-17

## 2024-07-08 LAB
ALBUMIN SERPL ELPH-MCNC: 4.3 G/DL — SIGNIFICANT CHANGE UP (ref 3.3–5)
ALP SERPL-CCNC: 59 U/L — SIGNIFICANT CHANGE UP (ref 40–120)
ALT FLD-CCNC: 24 U/L — SIGNIFICANT CHANGE UP (ref 10–45)
ANION GAP SERPL CALC-SCNC: 12 MMOL/L — SIGNIFICANT CHANGE UP (ref 5–17)
APPEARANCE UR: CLEAR — SIGNIFICANT CHANGE UP
AST SERPL-CCNC: 26 U/L — SIGNIFICANT CHANGE UP (ref 10–40)
BACTERIA # UR AUTO: NEGATIVE /HPF — SIGNIFICANT CHANGE UP
BASOPHILS # BLD AUTO: 0.08 K/UL — SIGNIFICANT CHANGE UP (ref 0–0.2)
BASOPHILS NFR BLD AUTO: 1.2 % — SIGNIFICANT CHANGE UP (ref 0–2)
BILIRUB SERPL-MCNC: 0.4 MG/DL — SIGNIFICANT CHANGE UP (ref 0.2–1.2)
BILIRUB UR-MCNC: NEGATIVE — SIGNIFICANT CHANGE UP
BUN SERPL-MCNC: 24 MG/DL — HIGH (ref 7–23)
CALCIUM SERPL-MCNC: 11.1 MG/DL — HIGH (ref 8.4–10.5)
CAST: 0 /LPF — SIGNIFICANT CHANGE UP (ref 0–4)
CHLORIDE SERPL-SCNC: 106 MMOL/L — SIGNIFICANT CHANGE UP (ref 96–108)
CO2 SERPL-SCNC: 21 MMOL/L — LOW (ref 22–31)
COLOR SPEC: YELLOW — SIGNIFICANT CHANGE UP
CREAT SERPL-MCNC: 0.99 MG/DL — SIGNIFICANT CHANGE UP (ref 0.5–1.3)
DIFF PNL FLD: ABNORMAL
EGFR: 85 ML/MIN/1.73M2 — SIGNIFICANT CHANGE UP
EOSINOPHIL # BLD AUTO: 0.1 K/UL — SIGNIFICANT CHANGE UP (ref 0–0.5)
EOSINOPHIL NFR BLD AUTO: 1.5 % — SIGNIFICANT CHANGE UP (ref 0–6)
GLUCOSE SERPL-MCNC: 119 MG/DL — HIGH (ref 70–99)
GLUCOSE UR QL: NEGATIVE MG/DL — SIGNIFICANT CHANGE UP
HCT VFR BLD CALC: 45.9 % — SIGNIFICANT CHANGE UP (ref 39–50)
HGB BLD-MCNC: 15 G/DL — SIGNIFICANT CHANGE UP (ref 13–17)
IMM GRANULOCYTES NFR BLD AUTO: 0.6 % — SIGNIFICANT CHANGE UP (ref 0–0.9)
KETONES UR-MCNC: NEGATIVE MG/DL — SIGNIFICANT CHANGE UP
LEUKOCYTE ESTERASE UR-ACNC: NEGATIVE — SIGNIFICANT CHANGE UP
LIDOCAIN IGE QN: 25 U/L — SIGNIFICANT CHANGE UP (ref 7–60)
LYMPHOCYTES # BLD AUTO: 1.83 K/UL — SIGNIFICANT CHANGE UP (ref 1–3.3)
LYMPHOCYTES # BLD AUTO: 27 % — SIGNIFICANT CHANGE UP (ref 13–44)
MCHC RBC-ENTMCNC: 29.2 PG — SIGNIFICANT CHANGE UP (ref 27–34)
MCHC RBC-ENTMCNC: 32.7 GM/DL — SIGNIFICANT CHANGE UP (ref 32–36)
MCV RBC AUTO: 89.5 FL — SIGNIFICANT CHANGE UP (ref 80–100)
MONOCYTES # BLD AUTO: 0.49 K/UL — SIGNIFICANT CHANGE UP (ref 0–0.9)
MONOCYTES NFR BLD AUTO: 7.2 % — SIGNIFICANT CHANGE UP (ref 2–14)
NEUTROPHILS # BLD AUTO: 4.24 K/UL — SIGNIFICANT CHANGE UP (ref 1.8–7.4)
NEUTROPHILS NFR BLD AUTO: 62.5 % — SIGNIFICANT CHANGE UP (ref 43–77)
NITRITE UR-MCNC: NEGATIVE — SIGNIFICANT CHANGE UP
NRBC # BLD: 0 /100 WBCS — SIGNIFICANT CHANGE UP (ref 0–0)
PH UR: 5.5 — SIGNIFICANT CHANGE UP (ref 5–8)
PLATELET # BLD AUTO: 263 K/UL — SIGNIFICANT CHANGE UP (ref 150–400)
POTASSIUM SERPL-MCNC: 4.5 MMOL/L — SIGNIFICANT CHANGE UP (ref 3.5–5.3)
POTASSIUM SERPL-SCNC: 4.5 MMOL/L — SIGNIFICANT CHANGE UP (ref 3.5–5.3)
PROT SERPL-MCNC: 7 G/DL — SIGNIFICANT CHANGE UP (ref 6–8.3)
PROT UR-MCNC: NEGATIVE MG/DL — SIGNIFICANT CHANGE UP
RBC # BLD: 5.13 M/UL — SIGNIFICANT CHANGE UP (ref 4.2–5.8)
RBC # FLD: 13 % — SIGNIFICANT CHANGE UP (ref 10.3–14.5)
RBC CASTS # UR COMP ASSIST: 11 /HPF — HIGH (ref 0–4)
SODIUM SERPL-SCNC: 139 MMOL/L — SIGNIFICANT CHANGE UP (ref 135–145)
SP GR SPEC: 1.02 — SIGNIFICANT CHANGE UP (ref 1–1.03)
SQUAMOUS # UR AUTO: 0 /HPF — SIGNIFICANT CHANGE UP (ref 0–5)
UROBILINOGEN FLD QL: 0.2 MG/DL — SIGNIFICANT CHANGE UP (ref 0.2–1)
WBC # BLD: 6.78 K/UL — SIGNIFICANT CHANGE UP (ref 3.8–10.5)
WBC # FLD AUTO: 6.78 K/UL — SIGNIFICANT CHANGE UP (ref 3.8–10.5)
WBC UR QL: 1 /HPF — SIGNIFICANT CHANGE UP (ref 0–5)

## 2024-07-08 PROCEDURE — 87086 URINE CULTURE/COLONY COUNT: CPT

## 2024-07-08 PROCEDURE — 99285 EMERGENCY DEPT VISIT HI MDM: CPT

## 2024-07-08 PROCEDURE — 74177 CT ABD & PELVIS W/CONTRAST: CPT | Mod: 26,MC

## 2024-07-08 PROCEDURE — 99285 EMERGENCY DEPT VISIT HI MDM: CPT | Mod: 25

## 2024-07-08 PROCEDURE — 74177 CT ABD & PELVIS W/CONTRAST: CPT | Mod: MC

## 2024-07-08 PROCEDURE — 96375 TX/PRO/DX INJ NEW DRUG ADDON: CPT

## 2024-07-08 PROCEDURE — 81001 URINALYSIS AUTO W/SCOPE: CPT

## 2024-07-08 PROCEDURE — 85025 COMPLETE CBC W/AUTO DIFF WBC: CPT

## 2024-07-08 PROCEDURE — 83690 ASSAY OF LIPASE: CPT

## 2024-07-08 PROCEDURE — 96374 THER/PROPH/DIAG INJ IV PUSH: CPT | Mod: XU

## 2024-07-08 PROCEDURE — 80053 COMPREHEN METABOLIC PANEL: CPT

## 2024-07-08 RX ORDER — SODIUM CHLORIDE 0.9 % (FLUSH) 0.9 %
1000 SYRINGE (ML) INJECTION ONCE
Refills: 0 | Status: COMPLETED | OUTPATIENT
Start: 2024-07-08 | End: 2024-07-08

## 2024-07-08 RX ORDER — TAMSULOSIN HYDROCHLORIDE 0.4 MG/1
1 CAPSULE ORAL
Qty: 14 | Refills: 0
Start: 2024-07-08 | End: 2024-07-21

## 2024-07-08 RX ORDER — OXYCODONE HYDROCHLORIDE 100 MG/5ML
1 SOLUTION ORAL
Qty: 6 | Refills: 0
Start: 2024-07-08 | End: 2024-07-10

## 2024-07-08 RX ORDER — MORPHINE SULFATE 100 MG/1
4 TABLET, EXTENDED RELEASE ORAL ONCE
Refills: 0 | Status: DISCONTINUED | OUTPATIENT
Start: 2024-07-08 | End: 2024-07-08

## 2024-07-08 RX ORDER — OXYCODONE HYDROCHLORIDE 100 MG/5ML
1 SOLUTION ORAL
Qty: 9 | Refills: 0
Start: 2024-07-08 | End: 2024-07-10

## 2024-07-08 RX ORDER — TAMSULOSIN HYDROCHLORIDE 0.4 MG/1
1 CAPSULE ORAL
Qty: 9 | Refills: 0
Start: 2024-07-08 | End: 2024-07-12

## 2024-07-08 RX ORDER — ACETAMINOPHEN 325 MG
1000 TABLET ORAL ONCE
Refills: 0 | Status: COMPLETED | OUTPATIENT
Start: 2024-07-08 | End: 2024-07-08

## 2024-07-08 RX ADMIN — Medication 1000 MILLILITER(S): at 10:13

## 2024-07-08 RX ADMIN — MORPHINE SULFATE 4 MILLIGRAM(S): 100 TABLET, EXTENDED RELEASE ORAL at 08:29

## 2024-07-08 RX ADMIN — Medication 400 MILLIGRAM(S): at 07:29

## 2024-07-09 LAB
CULTURE RESULTS: NO GROWTH — SIGNIFICANT CHANGE UP
SPECIMEN SOURCE: SIGNIFICANT CHANGE UP

## 2024-08-08 ENCOUNTER — TRANSCRIPTION ENCOUNTER (OUTPATIENT)
Age: 64
End: 2024-08-08

## 2024-08-08 ENCOUNTER — APPOINTMENT (OUTPATIENT)
Dept: UROLOGY | Facility: CLINIC | Age: 64
End: 2024-08-08

## 2024-08-08 PROBLEM — Z00.00 ENCOUNTER FOR PREVENTIVE HEALTH EXAMINATION: Status: ACTIVE | Noted: 2024-08-08

## 2024-08-08 PROBLEM — N20.1 LEFT URETERAL STONE: Status: ACTIVE | Noted: 2024-08-08

## 2024-08-08 PROBLEM — Z80.52 FAMILY HISTORY OF MALIGNANT NEOPLASM OF URINARY BLADDER: Status: ACTIVE | Noted: 2024-08-08

## 2024-08-08 PROBLEM — Z83.3 FAMILY HISTORY OF TYPE 2 DIABETES MELLITUS: Status: ACTIVE | Noted: 2024-08-08

## 2024-08-08 PROBLEM — N52.9 ED (ERECTILE DYSFUNCTION) OF ORGANIC ORIGIN: Status: ACTIVE | Noted: 2024-08-08

## 2024-08-08 PROBLEM — Z78.9 NON-SMOKER: Status: ACTIVE | Noted: 2024-08-08

## 2024-08-08 PROCEDURE — 99204 OFFICE O/P NEW MOD 45 MIN: CPT

## 2024-08-08 NOTE — ASSESSMENT
[FreeTextEntry1] : 64M presenting for left ureteral stone and ED  Imaging reviewed from ED. Two small left UVJ stones with mild hydronephrosis. Punctate intrarenal stones bilateral kidneys UA and urine culture from then without evidence of infection  - Continue flomax - Repeat CT pelvis in 2 weeks - Will plan for L URS in 3-4 weeks. Will cancel if stone passes before then - Hormone panel for ED workup/low libido - Start viagra 100 mg prn

## 2024-08-08 NOTE — HISTORY OF PRESENT ILLNESS
[FreeTextEntry1] : 64M with hyperparathyroidism presenting due to nephrolithiasis and ED  Started having pain LLQ about 5 weeks ago Went to ER July 8 2024 --> dx with  No pain since ER however last week had CT scan at outside facility still showing left distal stone Has had 24 hour urine test with elevated calcium Also takes allopurinol for gout  Elevated PTH; planning for likely parathyroid surgery    Erection rigidity w/o meds: 7/10 Therapies tried: none   Curvature: none Orgasm/ejaculation: normal Libido: a little low   Taking nitrates for chest pain: no A1c: nondiabetic Tobacco/nicotine use: occasional cigar Marijuana use: no Drug use: no Blood thinners: no   Surgical hx: thyroid, parathyroid, RIHR w/ mesh, umbilical hernia Also has small LIHR

## 2024-08-08 NOTE — PHYSICAL EXAM
[General Appearance - Well Developed] : well developed [General Appearance - Well Nourished] : well nourished [] : no respiratory distress [Exaggerated Use Of Accessory Muscles For Inspiration] : no accessory muscle use [Normal Station and Gait] : the gait and station were normal for the patient's age [Oriented To Time, Place, And Person] : oriented to person, place, and time [Not Anxious] : not anxious

## 2024-08-14 PROBLEM — E78.5 HYPERLIPIDEMIA, UNSPECIFIED: Chronic | Status: INACTIVE | Noted: 2023-04-17 | Resolved: 2024-08-14

## 2024-08-14 PROBLEM — I10 ESSENTIAL (PRIMARY) HYPERTENSION: Chronic | Status: INACTIVE | Noted: 2023-04-17 | Resolved: 2024-08-14

## 2024-08-14 PROBLEM — Z86.39 PERSONAL HISTORY OF OTHER ENDOCRINE, NUTRITIONAL AND METABOLIC DISEASE: Chronic | Status: INACTIVE | Noted: 2023-04-17 | Resolved: 2024-08-14

## 2024-08-14 PROBLEM — E05.90 THYROTOXICOSIS, UNSPECIFIED WITHOUT THYROTOXIC CRISIS OR STORM: Chronic | Status: INACTIVE | Noted: 2021-12-23 | Resolved: 2024-08-14

## 2024-08-19 ENCOUNTER — APPOINTMENT (OUTPATIENT)
Dept: CT IMAGING | Facility: IMAGING CENTER | Age: 64
End: 2024-08-19
Payer: SELF-PAY

## 2024-08-19 ENCOUNTER — OUTPATIENT (OUTPATIENT)
Dept: OUTPATIENT SERVICES | Facility: HOSPITAL | Age: 64
LOS: 1 days | End: 2024-08-19
Payer: SELF-PAY

## 2024-08-19 DIAGNOSIS — Z98.890 OTHER SPECIFIED POSTPROCEDURAL STATES: Chronic | ICD-10-CM

## 2024-08-19 DIAGNOSIS — N20.1 CALCULUS OF URETER: ICD-10-CM

## 2024-08-19 DIAGNOSIS — Z96.652 PRESENCE OF LEFT ARTIFICIAL KNEE JOINT: Chronic | ICD-10-CM

## 2024-08-19 DIAGNOSIS — E89.0 POSTPROCEDURAL HYPOTHYROIDISM: Chronic | ICD-10-CM

## 2024-08-19 PROBLEM — E03.9 HYPOTHYROIDISM, UNSPECIFIED: Chronic | Status: ACTIVE | Noted: 2024-08-14

## 2024-08-19 PROCEDURE — 72192 CT PELVIS W/O DYE: CPT | Mod: 26

## 2024-08-19 PROCEDURE — 72192 CT PELVIS W/O DYE: CPT

## 2024-08-20 ENCOUNTER — TRANSCRIPTION ENCOUNTER (OUTPATIENT)
Age: 64
End: 2024-08-20

## 2024-08-21 ENCOUNTER — TRANSCRIPTION ENCOUNTER (OUTPATIENT)
Age: 64
End: 2024-08-21

## 2024-08-22 ENCOUNTER — TRANSCRIPTION ENCOUNTER (OUTPATIENT)
Age: 64
End: 2024-08-22

## 2024-08-28 ENCOUNTER — APPOINTMENT (OUTPATIENT)
Dept: UROLOGY | Facility: HOSPITAL | Age: 64
End: 2024-08-28

## 2024-09-11 ENCOUNTER — TRANSCRIPTION ENCOUNTER (OUTPATIENT)
Age: 64
End: 2024-09-11

## 2024-10-01 ENCOUNTER — TRANSCRIPTION ENCOUNTER (OUTPATIENT)
Age: 64
End: 2024-10-01

## 2024-10-08 ENCOUNTER — APPOINTMENT (OUTPATIENT)
Dept: UROLOGY | Facility: CLINIC | Age: 64
End: 2024-10-08
Payer: COMMERCIAL

## 2024-10-08 VITALS
RESPIRATION RATE: 18 BRPM | HEART RATE: 69 BPM | SYSTOLIC BLOOD PRESSURE: 104 MMHG | TEMPERATURE: 97.2 F | OXYGEN SATURATION: 92 % | DIASTOLIC BLOOD PRESSURE: 70 MMHG

## 2024-10-08 DIAGNOSIS — N52.9 MALE ERECTILE DYSFUNCTION, UNSPECIFIED: ICD-10-CM

## 2024-10-08 DIAGNOSIS — E29.1 TESTICULAR HYPOFUNCTION: ICD-10-CM

## 2024-10-08 PROCEDURE — 99214 OFFICE O/P EST MOD 30 MIN: CPT

## 2024-10-08 PROCEDURE — G2211 COMPLEX E/M VISIT ADD ON: CPT | Mod: NC

## 2024-10-08 RX ORDER — TESTOSTERONE CYPIONATE 200 MG/ML
200 VIAL (ML) INTRAMUSCULAR
Qty: 8 | Refills: 0 | Status: ACTIVE | COMMUNITY
Start: 2024-10-08 | End: 1900-01-01

## 2024-10-08 RX ORDER — SYRINGE, DISPOSABLE, 3 ML
3 ML SYRINGE, EMPTY DISPOSABLE MISCELLANEOUS
Qty: 10 | Refills: 0 | Status: ACTIVE | COMMUNITY
Start: 2024-10-08 | End: 1900-01-01

## 2024-10-08 RX ORDER — ISOPROPYL ALCOHOL 70 ML/100ML
SWAB TOPICAL
Qty: 10 | Refills: 0 | Status: ACTIVE | COMMUNITY
Start: 2024-10-08 | End: 1900-01-01

## 2024-11-11 ENCOUNTER — APPOINTMENT (OUTPATIENT)
Dept: UROLOGY | Facility: CLINIC | Age: 64
End: 2024-11-11
Payer: COMMERCIAL

## 2024-11-11 DIAGNOSIS — E29.1 TESTICULAR HYPOFUNCTION: ICD-10-CM

## 2024-11-11 DIAGNOSIS — R39.12 BENIGN PROSTATIC HYPERPLASIA WITH LOWER URINARY TRACT SYMPMS: ICD-10-CM

## 2024-11-11 DIAGNOSIS — N40.1 BENIGN PROSTATIC HYPERPLASIA WITH LOWER URINARY TRACT SYMPMS: ICD-10-CM

## 2024-11-11 DIAGNOSIS — N52.9 MALE ERECTILE DYSFUNCTION, UNSPECIFIED: ICD-10-CM

## 2024-11-11 PROCEDURE — 99214 OFFICE O/P EST MOD 30 MIN: CPT

## 2024-11-11 PROCEDURE — G2211 COMPLEX E/M VISIT ADD ON: CPT | Mod: NC

## 2024-11-11 RX ORDER — TAMSULOSIN HYDROCHLORIDE 0.4 MG/1
0.4 CAPSULE ORAL
Qty: 90 | Refills: 3 | Status: ACTIVE | COMMUNITY
Start: 2024-11-11 | End: 1900-01-01

## 2024-11-11 RX ORDER — ISOPROPYL ALCOHOL 70 ML/100ML
SWAB TOPICAL
Qty: 10 | Refills: 0 | Status: ACTIVE | COMMUNITY
Start: 2024-11-11 | End: 1900-01-01

## 2024-11-11 RX ORDER — TADALAFIL 5 MG/1
5 TABLET ORAL
Qty: 90 | Refills: 3 | Status: ACTIVE | COMMUNITY
Start: 2024-11-11 | End: 1900-01-01

## 2024-11-11 RX ORDER — SILDENAFIL 100 MG/1
100 TABLET, FILM COATED ORAL
Qty: 30 | Refills: 6 | Status: ACTIVE | COMMUNITY
Start: 2024-11-11 | End: 1900-01-01

## 2024-11-11 RX ORDER — SYRINGE, DISPOSABLE, 3 ML
3 ML SYRINGE, EMPTY DISPOSABLE MISCELLANEOUS
Qty: 10 | Refills: 0 | Status: ACTIVE | COMMUNITY
Start: 2024-11-11 | End: 1900-01-01

## 2025-02-03 ENCOUNTER — TRANSCRIPTION ENCOUNTER (OUTPATIENT)
Age: 65
End: 2025-02-03

## 2025-02-03 DIAGNOSIS — E29.1 TESTICULAR HYPOFUNCTION: ICD-10-CM

## 2025-02-11 ENCOUNTER — APPOINTMENT (OUTPATIENT)
Dept: UROLOGY | Facility: CLINIC | Age: 65
End: 2025-02-11

## 2025-03-03 DIAGNOSIS — E29.1 TESTICULAR HYPOFUNCTION: ICD-10-CM

## 2025-05-19 ENCOUNTER — NON-APPOINTMENT (OUTPATIENT)
Age: 65
End: 2025-05-19

## 2025-05-20 DIAGNOSIS — E29.1 TESTICULAR HYPOFUNCTION: ICD-10-CM

## 2025-05-20 RX ORDER — SYRINGE, DISPOSABLE, 1 ML
1 ML SYRINGE, EMPTY DISPOSABLE MISCELLANEOUS
Qty: 10 | Refills: 0 | Status: ACTIVE | COMMUNITY
Start: 2025-05-20 | End: 1900-01-01

## 2025-06-19 ENCOUNTER — TRANSCRIPTION ENCOUNTER (OUTPATIENT)
Age: 65
End: 2025-06-19

## 2025-06-20 LAB
HCT VFR BLD CALC: 50.1 %
HGB BLD-MCNC: 17 G/DL
MCHC RBC-ENTMCNC: 30 PG
MCHC RBC-ENTMCNC: 33.9 G/DL
MCV RBC AUTO: 88.4 FL
PLATELET # BLD AUTO: 270 K/UL
RBC # BLD: 5.67 M/UL
RBC # FLD: 14.2 %
WBC # FLD AUTO: 6.48 K/UL

## 2025-06-23 LAB
ESTRADIOL SERPL-MCNC: 35 PG/ML
PSA FREE FLD-MCNC: 20 %
PSA FREE SERPL-MCNC: 0.65 NG/ML
PSA SERPL-MCNC: 3.15 NG/ML
TESTOST SERPL-MCNC: 426 NG/DL